# Patient Record
Sex: MALE | Race: WHITE | NOT HISPANIC OR LATINO | Employment: UNEMPLOYED | URBAN - NONMETROPOLITAN AREA
[De-identification: names, ages, dates, MRNs, and addresses within clinical notes are randomized per-mention and may not be internally consistent; named-entity substitution may affect disease eponyms.]

---

## 2017-03-13 ENCOUNTER — HOSPITAL ENCOUNTER (EMERGENCY)
Facility: HOSPITAL | Age: 22
Discharge: PSYCHIATRIC OR SUBSTANCE USE FACILITY | End: 2017-03-13
Attending: EMERGENCY MEDICINE | Admitting: EMERGENCY MEDICINE

## 2017-03-13 ENCOUNTER — HOSPITAL ENCOUNTER (INPATIENT)
Facility: HOSPITAL | Age: 22
LOS: 8 days | Discharge: HOME | End: 2017-03-21
Attending: PSYCHIATRY & NEUROLOGY | Admitting: PSYCHIATRY & NEUROLOGY

## 2017-03-13 VITALS
SYSTOLIC BLOOD PRESSURE: 165 MMHG | HEART RATE: 122 BPM | RESPIRATION RATE: 19 BRPM | HEIGHT: 71 IN | WEIGHT: 280 LBS | DIASTOLIC BLOOD PRESSURE: 103 MMHG | OXYGEN SATURATION: 98 % | BODY MASS INDEX: 39.2 KG/M2 | TEMPERATURE: 98.2 F

## 2017-03-13 DIAGNOSIS — Z91.89 POTENTIAL FOR HARM TO OTHERS: Primary | ICD-10-CM

## 2017-03-13 PROBLEM — R45.851 SUICIDAL IDEATIONS: Status: ACTIVE | Noted: 2017-03-13

## 2017-03-13 LAB
ALBUMIN SERPL-MCNC: 4.8 G/DL (ref 3.4–4.8)
ALBUMIN/GLOB SERPL: 1.3 G/DL (ref 1.1–1.8)
ALP SERPL-CCNC: 142 U/L (ref 38–126)
ALT SERPL W P-5'-P-CCNC: 48 U/L (ref 21–72)
AMPHET+METHAMPHET UR QL: NEGATIVE
ANION GAP SERPL CALCULATED.3IONS-SCNC: 16 MMOL/L (ref 5–15)
APAP SERPL-MCNC: <10 MCG/ML (ref 10–30)
AST SERPL-CCNC: 30 U/L (ref 17–59)
BARBITURATES UR QL SCN: NEGATIVE
BASOPHILS # BLD AUTO: 0.05 10*3/MM3 (ref 0–0.2)
BASOPHILS NFR BLD AUTO: 0.4 % (ref 0–2)
BENZODIAZ UR QL SCN: NEGATIVE
BILIRUB SERPL-MCNC: 0.5 MG/DL (ref 0.2–1.3)
BILIRUB UR QL STRIP: NEGATIVE
BUN BLD-MCNC: 15 MG/DL (ref 7–21)
BUN/CREAT SERPL: 19.5 (ref 7–25)
CALCIUM SPEC-SCNC: 9.4 MG/DL (ref 8.4–10.2)
CANNABINOIDS SERPL QL: NEGATIVE
CHLORIDE SERPL-SCNC: 100 MMOL/L (ref 95–110)
CLARITY UR: ABNORMAL
CO2 SERPL-SCNC: 25 MMOL/L (ref 22–31)
COCAINE UR QL: NEGATIVE
COLOR UR: YELLOW
CREAT BLD-MCNC: 0.77 MG/DL (ref 0.7–1.3)
DEPRECATED RDW RBC AUTO: 40.7 FL (ref 35.1–43.9)
EOSINOPHIL # BLD AUTO: 0.09 10*3/MM3 (ref 0–0.7)
EOSINOPHIL NFR BLD AUTO: 0.7 % (ref 0–7)
ERYTHROCYTE [DISTWIDTH] IN BLOOD BY AUTOMATED COUNT: 13.4 % (ref 11.5–14.5)
ETHANOL BLD-MCNC: <10 MG/DL (ref 0–10)
ETHANOL UR QL: <0.01 %
GFR SERPL CREATININE-BSD FRML MDRD: 125 ML/MIN/1.73 (ref 77–179)
GLOBULIN UR ELPH-MCNC: 3.6 GM/DL (ref 2.3–3.5)
GLUCOSE BLD-MCNC: 100 MG/DL (ref 60–100)
GLUCOSE UR STRIP-MCNC: NEGATIVE MG/DL
HCT VFR BLD AUTO: 49.2 % (ref 39–49)
HGB BLD-MCNC: 17.3 G/DL (ref 13.7–17.3)
HGB UR QL STRIP.AUTO: NEGATIVE
IMM GRANULOCYTES # BLD: 0.11 10*3/MM3 (ref 0–0.02)
IMM GRANULOCYTES NFR BLD: 0.8 % (ref 0–0.5)
KETONES UR QL STRIP: NEGATIVE
LEUKOCYTE ESTERASE UR QL STRIP.AUTO: NEGATIVE
LYMPHOCYTES # BLD AUTO: 1.99 10*3/MM3 (ref 0.6–4.2)
LYMPHOCYTES NFR BLD AUTO: 15.3 % (ref 10–50)
MCH RBC QN AUTO: 29.3 PG (ref 26.5–34)
MCHC RBC AUTO-ENTMCNC: 35.2 G/DL (ref 31.5–36.3)
MCV RBC AUTO: 83.4 FL (ref 80–98)
METHADONE UR QL SCN: NEGATIVE
MONOCYTES # BLD AUTO: 0.78 10*3/MM3 (ref 0–0.9)
MONOCYTES NFR BLD AUTO: 6 % (ref 0–12)
NEUTROPHILS # BLD AUTO: 9.97 10*3/MM3 (ref 2–8.6)
NEUTROPHILS NFR BLD AUTO: 76.8 % (ref 37–80)
NITRITE UR QL STRIP: NEGATIVE
OPIATES UR QL: NEGATIVE
OXYCODONE UR QL SCN: NEGATIVE
PH UR STRIP.AUTO: 7 [PH] (ref 5–9)
PLATELET # BLD AUTO: 227 10*3/MM3 (ref 150–450)
PMV BLD AUTO: 9.1 FL (ref 8–12)
POTASSIUM BLD-SCNC: 4.4 MMOL/L (ref 3.5–5.1)
PROT SERPL-MCNC: 8.4 G/DL (ref 6.3–8.6)
PROT UR QL STRIP: NEGATIVE
RBC # BLD AUTO: 5.9 10*6/MM3 (ref 4.37–5.74)
SALICYLATES SERPL-MCNC: <1 MG/DL (ref 10–20)
SODIUM BLD-SCNC: 141 MMOL/L (ref 137–145)
SP GR UR STRIP: 1.02 (ref 1–1.03)
T4 FREE SERPL-MCNC: 1.22 NG/DL (ref 0.78–2.19)
TSH SERPL DL<=0.05 MIU/L-ACNC: 1.09 MIU/ML (ref 0.46–4.68)
UROBILINOGEN UR QL STRIP: ABNORMAL
WBC NRBC COR # BLD: 12.99 10*3/MM3 (ref 3.2–9.8)

## 2017-03-13 RX ORDER — HYDROXYZINE PAMOATE 50 MG/1
50 CAPSULE ORAL EVERY 6 HOURS PRN
Status: DISCONTINUED | OUTPATIENT
Start: 2017-03-13 | End: 2017-03-21 | Stop reason: HOSPADM

## 2017-03-13 RX ORDER — OLANZAPINE 5 MG/1
10 TABLET, ORALLY DISINTEGRATING ORAL ONCE
Status: CANCELLED | OUTPATIENT
Start: 2017-03-13

## 2017-03-13 RX ORDER — NICOTINE 21 MG/24HR
1 PATCH, TRANSDERMAL 24 HOURS TRANSDERMAL EVERY 24 HOURS
Status: DISCONTINUED | OUTPATIENT
Start: 2017-03-13 | End: 2017-03-21 | Stop reason: HOSPADM

## 2017-03-13 RX ORDER — SERTRALINE HYDROCHLORIDE 100 MG/1
100 TABLET, FILM COATED ORAL DAILY
COMMUNITY
End: 2017-03-21 | Stop reason: HOSPADM

## 2017-03-13 RX ORDER — TRAZODONE HYDROCHLORIDE 50 MG/1
50 TABLET ORAL NIGHTLY PRN
Status: DISCONTINUED | OUTPATIENT
Start: 2017-03-13 | End: 2017-03-21 | Stop reason: HOSPADM

## 2017-03-13 RX ORDER — ACETAMINOPHEN 325 MG/1
650 TABLET ORAL EVERY 4 HOURS PRN
Status: DISCONTINUED | OUTPATIENT
Start: 2017-03-13 | End: 2017-03-21 | Stop reason: HOSPADM

## 2017-03-13 RX ORDER — BUSPIRONE HYDROCHLORIDE 10 MG/1
10 TABLET ORAL 3 TIMES DAILY
COMMUNITY
End: 2017-03-21 | Stop reason: HOSPADM

## 2017-03-13 RX ORDER — OLANZAPINE 5 MG/1
10 TABLET, ORALLY DISINTEGRATING ORAL ONCE
Status: COMPLETED | OUTPATIENT
Start: 2017-03-13 | End: 2017-03-13

## 2017-03-13 RX ORDER — HYDROXYZINE PAMOATE 25 MG/1
50 CAPSULE ORAL EVERY 6 HOURS PRN
Status: CANCELLED | OUTPATIENT
Start: 2017-03-13

## 2017-03-13 RX ORDER — ACETAMINOPHEN 325 MG/1
650 TABLET ORAL EVERY 4 HOURS PRN
Status: CANCELLED | OUTPATIENT
Start: 2017-03-13

## 2017-03-13 RX ORDER — TRAZODONE HYDROCHLORIDE 50 MG/1
50 TABLET ORAL NIGHTLY PRN
Status: CANCELLED | OUTPATIENT
Start: 2017-03-13

## 2017-03-13 RX ORDER — MAGNESIUM HYDROXIDE/ALUMINUM HYDROXICE/SIMETHICONE 120; 1200; 1200 MG/30ML; MG/30ML; MG/30ML
30 SUSPENSION ORAL EVERY 6 HOURS PRN
Status: CANCELLED | OUTPATIENT
Start: 2017-03-13

## 2017-03-13 RX ORDER — NICOTINE 21 MG/24HR
1 PATCH, TRANSDERMAL 24 HOURS TRANSDERMAL EVERY 24 HOURS
Status: CANCELLED | OUTPATIENT
Start: 2017-03-13

## 2017-03-13 RX ORDER — LOPERAMIDE HYDROCHLORIDE 2 MG/1
2 CAPSULE ORAL 4 TIMES DAILY PRN
Status: CANCELLED | OUTPATIENT
Start: 2017-03-13

## 2017-03-13 RX ORDER — MAGNESIUM HYDROXIDE/ALUMINUM HYDROXICE/SIMETHICONE 120; 1200; 1200 MG/30ML; MG/30ML; MG/30ML
30 SUSPENSION ORAL EVERY 6 HOURS PRN
Status: DISCONTINUED | OUTPATIENT
Start: 2017-03-13 | End: 2017-03-21 | Stop reason: HOSPADM

## 2017-03-13 RX ORDER — LOPERAMIDE HYDROCHLORIDE 2 MG/1
2 CAPSULE ORAL 4 TIMES DAILY PRN
Status: DISCONTINUED | OUTPATIENT
Start: 2017-03-13 | End: 2017-03-21 | Stop reason: HOSPADM

## 2017-03-13 RX ADMIN — OLANZAPINE 10 MG: 5 TABLET, ORALLY DISINTEGRATING ORAL at 21:16

## 2017-03-13 NOTE — ED PROVIDER NOTES
Subjective     History provided by:  Patient and parent    Rory Alvarado is a 22 year old male with a multiyear history of mood disorder, seen by Alexia Monk at [facility] who presents with worsening anxiety and behavior over the last 3-4 months. States that he has been anxious all of his life but that it has gotten worse lately. Parents state that he has been increasingly up all night and sleeping during the day. Has lost interest in his normal hobbies and just stays inside. Has had trouble concentrating. Has had increase in appetite with significant weight gain over the last year. Has psychomotor agitation regularly paces around the house. Denies thoughts of hurting self. Has had thoughts of hurting his cousin. Was in court this morning after being arrested while sitting in front of his pantry with a firearm and making threats towards the cousin. Denies auditory or visual hallucinations. Currently takes Zoloft and Buspar for his anxiety which he says does not help. Has previously taken Seroquel and some other medications patient did not remember. Had been on Adderall which he stopped about a year ago for ADD.    Review of Systems   Constitutional: Positive for activity change and appetite change (increased). Negative for chills, diaphoresis, fatigue and fever.   HENT: Negative for congestion, rhinorrhea and sore throat.    Eyes: Negative for pain, discharge, itching and visual disturbance.   Respiratory: Negative for cough, chest tightness, shortness of breath and wheezing.    Cardiovascular: Negative for chest pain and palpitations.   Gastrointestinal: Positive for nausea (sometimes with the anxiety). Negative for abdominal distention, abdominal pain, blood in stool, diarrhea and vomiting.   Genitourinary: Negative for dysuria, flank pain and hematuria.   Musculoskeletal: Negative for arthralgias, neck pain and neck stiffness.   Skin: Negative for color change and rash.   Neurological: Negative  for dizziness, seizures, syncope and headaches.   Psychiatric/Behavioral: Positive for agitation, behavioral problems, decreased concentration and sleep disturbance. Negative for confusion, hallucinations, self-injury and suicidal ideas. The patient is nervous/anxious.        History reviewed. No pertinent past medical history.    No Known Allergies    History reviewed. No pertinent past surgical history.    No family history on file.    Social History     Social History   • Marital status: Single     Spouse name: N/A   • Number of children: N/A   • Years of education: N/A     Social History Main Topics   • Smoking status: Current Some Day Smoker     Packs/day: 1.00     Years: 5.00     Types: Cigarettes     Start date: 3/13/2012   • Smokeless tobacco: Current User     Types: Chew   • Alcohol use No   • Drug use: Yes     Special: Marijuana   • Sexual activity: No     Other Topics Concern   • None     Social History Narrative   • None           Objective   Physical Exam   Constitutional: He is oriented to person, place, and time. He appears well-developed and well-nourished. No distress.   HENT:   Head: Normocephalic and atraumatic.   Right Ear: External ear normal.   Left Ear: External ear normal.   Nose: Nose normal.   Mouth/Throat: Oropharynx is clear and moist. No oropharyngeal exudate.   Eyes: Conjunctivae and EOM are normal. Pupils are equal, round, and reactive to light. Right eye exhibits no discharge. Left eye exhibits no discharge. No scleral icterus.   Neck: Normal range of motion. Neck supple. No JVD present. No tracheal deviation present. No thyromegaly present.   Cardiovascular: Normal rate, regular rhythm, normal heart sounds and intact distal pulses.  Exam reveals no gallop and no friction rub.    No murmur heard.  Pulmonary/Chest: Effort normal and breath sounds normal. No stridor. No respiratory distress. He has no wheezes. He has no rales.   Abdominal: Soft. Bowel sounds are normal. He exhibits no  distension and no mass. There is no tenderness. There is no rebound and no guarding. No hernia.   Musculoskeletal: Normal range of motion. He exhibits no edema, tenderness or deformity.   Neurological: He is alert and oriented to person, place, and time. No cranial nerve deficit. He exhibits normal muscle tone.   Skin: Skin is warm and dry. No rash noted. He is not diaphoretic. No pallor.   Psychiatric: His speech is normal. Judgment normal. His mood appears anxious. He is agitated. He is not aggressive, not hyperactive, not slowed, not withdrawn, not actively hallucinating and not combative. Thought content is not delusional. He expresses homicidal (thoughts of harming his cousin) ideation. He expresses no suicidal ideation. He expresses no suicidal plans and no homicidal plans. He is attentive.   Nursing note and vitals reviewed.      Procedures      Lab Results (last 24 hours)     Procedure Component Value Units Date/Time    TSH [[id number]]  (Normal) Collected:  03/13/17 1658    Specimen:  Blood Updated:  03/13/17 1758     TSH 1.090 mIU/mL     T4, Free [[id number]]  (Normal) Collected:  03/13/17 1658    Specimen:  Blood Updated:  03/13/17 1745     Free T4 1.22 ng/dL     CBC & Differential [[id number]] Collected:  03/13/17 1659    Specimen:  Blood Updated:  03/13/17 1715    Narrative:       The following orders were created for panel order CBC & Differential.  Procedure                               Abnormality         Status                     ---------                               -----------         ------                     CBC Auto Differential[[id number]]         Abnormal            Final result                 Please view results for these tests on the individual orders.    Comprehensive Metabolic Panel [[id number]]  (Abnormal) Collected:  03/13/17 1659    Specimen:  Blood Updated:  03/13/17 1730     Glucose 100 mg/dL      BUN 15 mg/dL      Creatinine 0.77 mg/dL      Sodium 141 mmol/L      Potassium 4.4 mmol/L       Chloride 100 mmol/L      CO2 25.0 mmol/L      Calcium 9.4 mg/dL      Total Protein 8.4 g/dL      Albumin 4.80 g/dL      ALT (SGPT) 48 U/L      AST (SGOT) 30 U/L      Alkaline Phosphatase 142 (H) U/L      Total Bilirubin 0.5 mg/dL      eGFR Non African Amer 125 mL/min/1.73      Globulin 3.6 (H) gm/dL      A/G Ratio 1.3 g/dL      BUN/Creatinine Ratio 19.5      Anion Gap 16.0 (H) mmol/L     Urinalysis With / Culture If Indicated [[id number]]  (Abnormal) Collected:  03/13/17 1659    Specimen:  Urine from Urine, Clean Catch Updated:  03/13/17 1721     Color, UA Yellow      Appearance, UA Cloudy (A)      pH, UA 7.0      Specific Gravity, UA 1.022      Glucose, UA Negative      Ketones, UA Negative      Bilirubin, UA Negative      Blood, UA Negative      Protein, UA Negative      Leuk Esterase, UA Negative      Nitrite, UA Negative      Urobilinogen, UA 0.2 E.U./dL     Narrative:       Urine microscopic not indicated.    Salicylate Level [[id number]]  (Abnormal) Collected:  03/13/17 1659    Specimen:  Blood Updated:  03/13/17 1730     Salicylate <1.0 (L) mg/dL     Acetaminophen Level [[id number]]  (Abnormal) Collected:  03/13/17 1659    Specimen:  Blood Updated:  03/13/17 1731     Acetaminophen <10.0 (L) mcg/mL     Ethanol [[id number]] Collected:  03/13/17 1659    Specimen:  Blood Updated:  03/13/17 1729     Ethanol <10 mg/dL      Ethanol % <0.010 %     Urine Drug Screen [[id number]]  (Normal) Collected:  03/13/17 1659    Specimen:  Urine from Urine, Clean Catch Updated:  03/13/17 1742     Amphetamine Screen, Urine Negative      Barbiturates Screen, Urine Negative      Benzodiazepine Screen, Urine Negative      Cocaine Screen, Urine Negative      Methadone Screen, Urine Negative      Opiate Screen Negative      Oxycodone Screen, Urine Negative      THC, Screen, Urine Negative     Narrative:       Negative Thresholds For Drugs Screened in Urine:     Amphetamines          500 ng/ml  Barbiturates          200 ng/ml  Benzodiazepines        200 ng/ml  Cocaine               150 ng/ml  Methadone             300 ng/mL  Opiates               300 ng/mL  Oxycodone             100 ng/mL  THC                   20 ng/mL    The normal value for all drugs tested is negative. This report includes final unconfirmed screening results.  A positive result by this assay can be, at your request, sent to the Reference Lab for confirmation by gas chromatography. Unconfirmed results must not be used for non-medical purposes, such as employment or legal testing. Clinical consideration should be applied to any drug of abuse test result, particularly when unconfirmed results are used.    CBC Auto Differential [[id number]]  (Abnormal) Collected:  03/13/17 1659    Specimen:  Blood Updated:  03/13/17 1715     WBC 12.99 (H) 10*3/mm3      RBC 5.90 (H) 10*6/mm3      Hemoglobin 17.3 g/dL      Hematocrit 49.2 (H) %      MCV 83.4 fL      MCH 29.3 pg      MCHC 35.2 g/dL      RDW 13.4 %      RDW-SD 40.7 fl      MPV 9.1 fL      Platelets 227 10*3/mm3      Neutrophil % 76.8 %      Lymphocyte % 15.3 %      Monocyte % 6.0 %      Eosinophil % 0.7 %      Basophil % 0.4 %      Immature Grans % 0.8 (H) %      Neutrophils, Absolute 9.97 (H) 10*3/mm3      Lymphocytes, Absolute 1.99 10*3/mm3      Monocytes, Absolute 0.78 10*3/mm3      Eosinophils, Absolute 0.09 10*3/mm3      Basophils, Absolute 0.05 10*3/mm3      Immature Grans, Absolute 0.11 (H) 10*3/mm3           No results found.         ED Course  ED Course   Comment By Time   Seen and examined Leonid Cardenas MD 03/13 1612   Psych contacted to come evaluate. Leonid Cardenas MD 03/13 1628   Pt resting comfortably in room Leonid Cardenas MD 03/13 1912                  [facility]    Final diagnoses:   Potential for harm to others            Leonid Cardenas MD  Resident  03/13/17 1922

## 2017-03-14 PROBLEM — F31.5 BIPOLAR AFFECTIVE DISORDER, DEPRESSED, SEVERE, WITH PSYCHOTIC BEHAVIOR (HCC): Status: ACTIVE | Noted: 2017-03-14

## 2017-03-14 PROBLEM — F10.10 ALCOHOL USE DISORDER, MILD, ABUSE: Status: ACTIVE | Noted: 2017-03-14

## 2017-03-14 PROCEDURE — 99222 1ST HOSP IP/OBS MODERATE 55: CPT | Performed by: FAMILY MEDICINE

## 2017-03-14 PROCEDURE — 90791 PSYCH DIAGNOSTIC EVALUATION: CPT | Performed by: PSYCHIATRY & NEUROLOGY

## 2017-03-14 RX ADMIN — HYDROXYZINE PAMOATE 50 MG: 50 CAPSULE ORAL at 08:11

## 2017-03-14 RX ADMIN — ZIPRASIDONE HCL 60 MG: 20 CAPSULE ORAL at 18:05

## 2017-03-14 NOTE — PLAN OF CARE
Problem: Risk for Self Injury/Neglect  Goal: Treatment Goal: Remain safe during length of stay, learn and adopt new coping skills, and be free of self-injurious ideation, impulses and acts at the time of discharge  Outcome: Ongoing (interventions implemented as appropriate)  Goal: Verbalize thoughts and feelings associated with:  Outcome: Ongoing (interventions implemented as appropriate)  Goal: Refrain from harming self  Outcome: Ongoing (interventions implemented as appropriate)  Goal: Attend and participate in unit activities, including therapeutic, recreational, and educational groups  Outcome: Ongoing (interventions implemented as appropriate)  Goal: Recognize maladaptive responses and adopt new coping mechanisms  Outcome: Ongoing (interventions implemented as appropriate)  Goal: Complete daily ADLs, including personal hygiene independently, as able  Outcome: Ongoing (interventions implemented as appropriate)    Problem: BH Overarching Goals  Goal: Adheres to Safety Considerations for Self and Others  Outcome: Ongoing (interventions implemented as appropriate)  Goal: Optimized Coping Skills in Response to Life Stressors  Outcome: Ongoing (interventions implemented as appropriate)  Goal: Develops/Participates in Therapeutic [place] to Support Successful Transition  Outcome: Ongoing (interventions implemented as appropriate)

## 2017-03-14 NOTE — NURSING NOTE
Dr Gabriel MATA         General  NONE    Eyes   None     ENT/Mouth   None    Cardio   None    Resp   None    GI    None       None    MS    None    Skin/Hair/Nails   None    Neuro   None

## 2017-03-14 NOTE — NURSING NOTE
"Behavior   Anxiety 8  Depression 10  Pain 0  AVH no  S/I no  H/I no    Pt resting in his bed this morning.  He refused to get up to eat breakfast.  Pt has very flat affect and makes very little eye contact when speaking with this nurse.  Pt stated that he does not want to talk about the reason why he is here.  \"I just got angry with my cousin is all I'm going to say.\"  Pt did say that he slept good last night.         Intervention  Instructed in med usage and effects, encouraged to verbalize needs. Meds administered as ordered.  Pt encouraged to attend groups and follow treatment plan.          Response  Verbalized understanding           Plan  Continue to monitor for safety/  every 15 minute monitoring checks.  "
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
## 2017-03-14 NOTE — NURSING NOTE
"Behavior   Anxiety 10  Depression 10  Pain 0  AVH no  S/I no  H/I no  Denies SI at present time.  He signed adm. papers,  Given tour of unit.  Given scrubs to sleep in.  He was polite, cooperative but refused to look at me.  He gave brief answers, would not elaborate on any question.  He seemed guarded in what he said to me.  He took zydis at hs but was reluctant.  He kept asking \"now this won't freak me out will it.\"  Explained sev. times that it would help him relax and sleep.          Intervention  Instructed in med usage and effects, encouraged to verbalize needs. Meds administered as ordered.          Response  Verbalized understanding           Plan  Continue to monitor for safety/  every 15 minute monitoring checks.    "

## 2017-03-14 NOTE — ED NOTES
"Deputy Fabian Juárez called and was informed of pt's homicidal ideation of \"Russel Fishman\".  Deputy Juárez was given further information about event and informed this RN that he would be contacting [facility] department of same.     Nidia Juárez RN  03/13/17 1919    "

## 2017-03-14 NOTE — CONSULTS
CHIEF COMPLAINT/REASON FOR VISIT:  Homicidal Ideation and Agitation    HPI:  Patient presented to our ED with the above complaint on March 13 at 1:51 AM. ED notes: Was in court this morning after being arrested while sitting in front of his pantry with a firearm and making threats towards the cousin. Denies auditory or visual hallucinations.  Patient prefers not to talk to me this morning.     PROBLEM LIST:  Patient Active Problem List    Diagnosis   • Bipolar affective disorder, depressed, severe, with psychotic behavior [F31.5]   • Alcohol use disorder, mild, abuse [F10.10]   • Suicidal ideations [R45.851]         CURRENT MEDICATIONS:  Prescriptions Prior to Admission   Medication Sig Dispense Refill Last Dose   • busPIRone (BUSPAR) 10 MG tablet Take 10 mg by mouth 3 (Three) Times a Day.   3/12/2017 at Unknown time   • sertraline (ZOLOFT) 100 MG tablet Take 100 mg by mouth Daily.   3/12/2017 at Unknown time       ALLERGIES:  Review of patient's allergies indicates no known allergies.      PAST MEDICAL/SURGICAL HISTORY:  Past Medical History   Diagnosis Date   • Alcohol use disorder, mild, abuse 3/14/2017   • Anxiety    • Bipolar affective disorder, depressed, severe, with psychotic behavior 3/14/2017   • Depression        History reviewed. No pertinent past surgical history.    Review of Systems   Constitutional: Negative for activity change, appetite change, fatigue and fever.   HENT: Negative for congestion, ear discharge, ear pain, facial swelling, hearing loss, nosebleeds, postnasal drip, rhinorrhea, sinus pressure, sore throat, tinnitus and trouble swallowing.    Eyes: Negative for pain, discharge and visual disturbance.   Respiratory: Negative for cough, shortness of breath and wheezing.    Cardiovascular: Negative for chest pain, palpitations and leg swelling.   Gastrointestinal: Negative for abdominal pain, blood in stool, constipation, diarrhea, nausea and vomiting.   Genitourinary: Negative for  difficulty urinating, discharge, dysuria, flank pain, frequency, hematuria, penile pain, penile swelling, scrotal swelling, testicular pain and urgency.   Musculoskeletal: Negative for arthralgias, back pain, joint swelling, myalgias and neck pain.   Skin: Negative for rash and wound.   Neurological: Negative for dizziness, seizures, syncope, weakness, light-headedness and headaches.   Hematological: Negative for adenopathy.       Social History     Social History   • Marital status: Single     Spouse name: N/A   • Number of children: N/A   • Years of education: N/A     Occupational History   • Not on file.     Social History Main Topics   • Smoking status: Current Some Day Smoker     Packs/day: 0.00     Years: 5.00     Types: Cigarettes     Start date: 3/13/2012   • Smokeless tobacco: Current User     Types: Chew      Comment: smokes occ, not every day   • Alcohol use No   • Drug use: Yes     Special: Marijuana   • Sexual activity: No     Other Topics Concern   • Not on file     Social History Narrative    Substance Abuse: Alcohol: has had periods of a few weeks of daily and sometimes mild binge, nothing in last 1-2 months,  Cannabis: back in high school, nothing since then, Methamphetamine: does not use, Opiate: does not use, Cocaine: does not use, Synthetic: does not use and IV drug use: denies; notes in high school he tried things at parties but not sure what        Marriages: 0    Current Relationships: Single    Children: 0        Education: high school diploma/GED     Occupation: individual, not currently working    Living Situation: mother and father       Family History   Problem Relation Age of Onset   • Anxiety disorder Mother    • Depression Mother    • Diabetes Father    • Fibromyalgia Father    • Anxiety disorder Father    • Depression Father    • Depression Sister    • Psychosis Paternal Grandmother    • Psychosis Other    • Suicidality Neg Hx              Objective     Visit Vitals   • /84 (BP  "Location: Right arm, Patient Position: Sitting)   • Pulse 96   • Temp 97.8 °F (36.6 °C) (Tympanic)   • Resp 18   • Ht 71\" (180.3 cm)   • Wt 283 lb (128 kg)   • SpO2 96%   • BMI 39.47 kg/m2       Physical Exam   Constitutional: He appears well-developed and well-nourished.   HENT:   Head: Normocephalic and atraumatic.   Eyes: Conjunctivae and EOM are normal.   Neck: Normal range of motion. Neck supple. No thyromegaly present.   Cardiovascular: Normal rate, regular rhythm and normal heart sounds.  Exam reveals no gallop and no friction rub.    No murmur heard.  Pulmonary/Chest: Effort normal and breath sounds normal. No respiratory distress. He has no wheezes. He has no rales.   Abdominal: Soft. He exhibits no distension and no mass. There is no tenderness. There is no rebound and no guarding.   Musculoskeletal: Normal range of motion.   Lymphadenopathy:     He has no cervical adenopathy.   Neurological: He is alert. He has normal strength and normal reflexes. He displays no tremor and normal reflexes. No cranial nerve deficit or sensory deficit. He exhibits normal muscle tone. Coordination normal. He displays no Babinski's sign on the right side. He displays no Babinski's sign on the left side.   Reflex Scores:       Tricep reflexes are 2+ on the right side and 2+ on the left side.       Bicep reflexes are 2+ on the right side and 2+ on the left side.       Brachioradialis reflexes are 2+ on the right side and 2+ on the left side.       Patellar reflexes are 2+ on the right side and 2+ on the left side.       Achilles reflexes are 2+ on the right side and 2+ on the left side.  Skin: Skin is warm and dry. No rash noted. No erythema.   Nursing note and vitals reviewed.      Dystonia/Tardive Dyskinesia  Absent  Meningeal Signs  Absent    Diagnostic Studies    TSH [[id number]] (Normal) Collected: 03/13/17 1658      Specimen: Blood Updated: 03/13/17 1758       TSH 1.090 mIU/mL       T4, Free [[id number]] (Normal) Collected: " 03/13/17 1658     Specimen: Blood Updated: 03/13/17 1745       Free T4 1.22 ng/dL       CBC & Differential [[id number]] Collected: 03/13/17 1659     Specimen: Blood Updated: 03/13/17 1715     Narrative:       The following orders were created for panel order CBC & Differential.  Procedure Abnormality Status   --------- ----------- ------   CBC Auto Differential[[id number]] Abnormal Final result      Please view results for these tests on the individual orders.     Comprehensive Metabolic Panel [[id number]] (Abnormal) Collected: 03/13/17 1659     Specimen: Blood Updated: 03/13/17 1730       Glucose 100 mg/dL         BUN 15 mg/dL         Creatinine 0.77 mg/dL         Sodium 141 mmol/L         Potassium 4.4 mmol/L         Chloride 100 mmol/L         CO2 25.0 mmol/L         Calcium 9.4 mg/dL         Total Protein 8.4 g/dL         Albumin 4.80 g/dL         ALT (SGPT) 48 U/L         AST (SGOT) 30 U/L         Alkaline Phosphatase 142 (H) U/L         Total Bilirubin 0.5 mg/dL         eGFR Non African Amer 125 mL/min/1.73         Globulin 3.6 (H) gm/dL         A/G Ratio 1.3 g/dL         BUN/Creatinine Ratio 19.5         Anion Gap 16.0 (H) mmol/L       Urinalysis With / Culture If Indicated [[id number]] (Abnormal) Collected: 03/13/17 1659     Specimen: Urine from Urine, Clean Catch Updated: 03/13/17 1721       Color, UA Yellow         Appearance, UA Cloudy (A)         pH, UA 7.0         Specific Gravity, UA 1.022         Glucose, UA Negative         Ketones, UA Negative         Bilirubin, UA Negative         Blood, UA Negative         Protein, UA Negative         Leuk Esterase, UA Negative         Nitrite, UA Negative         Urobilinogen, UA 0.2 E.U./dL       Narrative:       Urine microscopic not indicated.     Salicylate Level [[id number]] (Abnormal) Collected: 03/13/17 1659     Specimen: Blood Updated: 03/13/17 1730       Salicylate <1.0 (L) mg/dL       Acetaminophen Level [[id number]] (Abnormal) Collected: 03/13/17 1659      Specimen: Blood Updated: 03/13/17 1731       Acetaminophen <10.0 (L) mcg/mL       Ethanol [[id number]] Collected: 03/13/17 1659     Specimen: Blood Updated: 03/13/17 1729       Ethanol <10 mg/dL         Ethanol % <0.010 %       Urine Drug Screen [[id number]] (Normal) Collected: 03/13/17 1659     Specimen: Urine from Urine, Clean Catch Updated: 03/13/17 1742       Amphetamine Screen, Urine Negative         Barbiturates Screen, Urine Negative         Benzodiazepine Screen, Urine Negative         Cocaine Screen, Urine Negative         Methadone Screen, Urine Negative         Opiate Screen Negative         Oxycodone Screen, Urine Negative         THC, Screen, Urine Negative      WBC 12.99 (H) 10*3/mm3           RBC 5.90 (H) 10*6/mm3         Hemoglobin 17.3 g/dL         Hematocrit 49.2 (H) %            Assessment/Plan     Patient Active Problem List    Diagnosis   • Bipolar affective disorder, depressed, severe, with psychotic behavior [F31.5]   • Alcohol use disorder, mild, abuse [F10.10]   • Suicidal ideations [R45.851]           Continue Home Meds as ordered. Mental health and pain issues managed per psychiatry.  Further diagnostic studies or intervention based on hospital course.

## 2017-03-14 NOTE — ED NOTES
"Called [facility] to inform of pt with homicidal ideation.  [facility] informed this RN to call the \"County in which the event occurred\".  [facility] was informed of \"no event\" but that pt was in ED and expressed homicidal ideation with a specific person.  Informed to call [facility].     Nidia Juárez RN  03/13/17 1915    "

## 2017-03-14 NOTE — ED NOTES
"Called [facility] to inform of homicidal ideation from pt.  Informing of pt living in [facility] verbalizing homicidal ideation of pt's cousin: \"Russel Fishman\".  Telephone number was taken and was informed that I would receive a telephone call related to homicidal verbalization.     Nidia Juárez RN  03/13/17 1917    "

## 2017-03-14 NOTE — H&P
"3/14/2017    Source of History:  chart review and the patient    Chief Complaint: suicidal ideation, homicidal ideation, paranoia, anxiety and depression    History of Present Illness:    Patient is a 22 y.o. male who presents with suicidal ideation, homicidal ideation, paranoia, anxiety and depression. Onset of symptoms was gradual starting 1-2 years ago.  Symptoms have been present on a increasingly more frequent basis. Symptoms are associated with anxiety, depressed mood, irritability and ETOH use issues.  Symptoms are aggravated by economic problems and self esteem and cognitive.  Patients symptoms severity is severe.   Patient reports that level of hopefulness is 1/10.  Patient's symptoms occur in the context of chronic mild depression that has gotten much worse due to no clear situational stressors.  In the emergency ER he reported HI towards cousin.  He notes dealing with anxiety and depression and some anger issues.  He notes parents told him to come to ER.  He went [facility] b/c he had gun and he did not have conceal carry.  He had a gun in the back pocket.  \"I was scaring people.\"  He notes he was staring at people and was sitting in truck smoking cigs for a few hours at a convenience shop.  The first time the police let him go.  The second time he was not there very long and the  came.  This happened around Friday and Saturday.  He notes he will stay at home and the anxiety \"will be crippling.\"  He does not feel safe in public.  He notes he has felt this way for the last year.  He feels people are staring at him and making fun of him.  He has not confronted anyone about it.  He notes sleeps all day and is up at night.  \"Feel like I'm in a big hole.\"  He notes \"when I am about to find my way out I get lost again.\"  He notes howell were problematic.  He notes being depressed for a month or more then \"I'd be all right.\"  He notes he does note interact much with any of his friends.  He notes some depression " has been there for most of his life but for the last 18 months he is very depressed and isolating.  He notes avoiding people he knows in public b/c he has no desire to interact.  He notes anhedonia is significant.  He has cut back on his hunting.  He notes last time was Feb but last time fishing has been about 1 year.  He notes he has had issues with his cousin for some time but denies that he wants to go find him and do something to him.  The ER notes indicate communication with Aunt asking his location and making statement that he wanted to use gun, knife or hand.  He denies any AVH.  He notes his mom is his reason for not attempted suicide.  He notes 4 years MGF  and that was very difficult for him b/c grief and family issues that started afterward.  He notes since the zyprexa last night he feels less paranoid and some calm.  He notes having periods of 2-3 days of decreased need for sleep and he will spend time in the garage tinkering with things.  He notes nothing gets accomplished but he is active.  Denies grandiosity and denies recklessness.      Psychiatric Review Of Systems:  Pertinent items are noted in HPI.    History  Past psychiatric history:    Psychiatric Hospitalizations: Patient has had no prior hospitalizations.    Suicide Attempts: Patient has had no prior suicide attempts.    Prior Treatment and Medications Tried: zoloft: took for 2-3 months at 100mg and did not find it helpful; buspar 10mg tid and did not find it helped anything; seroquel: sedated and felt he had hangover; possibly abilify was tried.  Prozac and paxil did nothing.    History of violence or legal issues: The patient has no significant history of legal issues other than this weekend with a criminal trespass charge.    Social History:    Social History     Social History   • Marital status: Single     Spouse name: N/A   • Number of children: N/A   • Years of education: N/A     Occupational History   • Not on file.     Social  History Main Topics   • Smoking status: Current Some Day Smoker     Packs/day: 0.00     Years: 5.00     Types: Cigarettes     Start date: 3/13/2012   • Smokeless tobacco: Current User     Types: Chew      Comment: smokes occ, not every day   • Alcohol use No   • Drug use: Yes     Special: Marijuana   • Sexual activity: No     Other Topics Concern   • Not on file     Social History Narrative    Substance Abuse: Alcohol: has had periods of a few weeks of daily and sometimes mild binge, nothing in last 1-2 months,  Cannabis: back in high school, nothing since then, Methamphetamine: does not use, Opiate: does not use, Cocaine: does not use, Synthetic: does not use and IV drug use: denies; notes in high school he tried things at parties but not sure what        Marriages: 0    Current Relationships: Single    Children: 0        Education: high school diploma/GED     Occupation: individual, not currently working    Living Situation: mother and father       Abuse/Trauma: History of physical abuse: no and History of sexual abuse: no      Family History:    Family History   Problem Relation Age of Onset   • Anxiety disorder Mother    • Depression Mother    • Diabetes Father    • Fibromyalgia Father    • Anxiety disorder Father    • Depression Father    • Depression Sister    • Psychosis Paternal Grandmother    • Psychosis Other    • Suicidality Neg Hx          Past Medical and Surgical History:    Past Medical History   Diagnosis Date   • Alcohol use disorder, mild, abuse 3/14/2017   • Anxiety    • Bipolar affective disorder, depressed, severe, with psychotic behavior 3/14/2017   • Depression      History reviewed. No pertinent past surgical history.  Allergies:  Review of patient's allergies indicates no known allergies.  Prescriptions Prior to Admission   Medication Sig Dispense Refill Last Dose   • busPIRone (BUSPAR) 10 MG tablet Take 10 mg by mouth 3 (Three) Times a Day.   3/12/2017 at Unknown time   • sertraline  "(ZOLOFT) 100 MG tablet Take 100 mg by mouth Daily.   3/12/2017 at Unknown time       Medical Review Of Systems:  Reviewed review of systems from  Dr. Rios's consult note from today.    Objective     Vital Signs    Temp:  [97.8 °F (36.6 °C)-98.2 °F (36.8 °C)] 97.8 °F (36.6 °C)  Heart Rate:  [] 96  Resp:  [18-20] 18  BP: (140-173)/() 140/84    Physical Exam:   General Appearance: alert, appears stated age and cooperative,  Hygiene:   good  Gait & Station: Normal  Musculoskeletal: No tremors or abnormal involuntary movements    Mental Status Exam:   Cooperation:  Cooperative  Eye Contact:  Fair  Psychomotor Behavior:  Restless  Mood: Sad/Depressed and Anxious/Nervous  Affect:  constricted  Speech:  Normal  Thought Process:  Coherent and Appropriately abstract  Associations: Goal Directed  Thought Content:     Normal   Suicidal:  denies   Homicidal:  denies   Hallucinations:  None   Delusion:  Paranoid  Cognitive Functioning:   Consciousness: awake and alert   Orientation:  Person, Place and Situation   Attention: normal Concentration: \"ldrow\"   Language:  Intact Vocabulary: Average   Short Term Memory: Intact and 3/3   Long Term Memory: Intact   Fund of Knowledge: Average  Reliability:  fair  Insight:  Fair  Judgement:  Fair  Impulse Control:  Fair    Diagnostic Data:    Recent Results (from the past 72 hour(s))   TSH    Collection Time: 03/13/17  4:58 PM   Result Value Ref Range    TSH 1.090 0.460 - 4.680 mIU/mL   T4, Free    Collection Time: 03/13/17  4:58 PM   Result Value Ref Range    Free T4 1.22 0.78 - 2.19 ng/dL   Comprehensive Metabolic Panel    Collection Time: 03/13/17  4:59 PM   Result Value Ref Range    Glucose 100 60 - 100 mg/dL    BUN 15 7 - 21 mg/dL    Creatinine 0.77 0.70 - 1.30 mg/dL    Sodium 141 137 - 145 mmol/L    Potassium 4.4 3.5 - 5.1 mmol/L    Chloride 100 95 - 110 mmol/L    CO2 25.0 22.0 - 31.0 mmol/L    Calcium 9.4 8.4 - 10.2 mg/dL    Total Protein 8.4 6.3 - 8.6 g/dL    Albumin " 4.80 3.40 - 4.80 g/dL    ALT (SGPT) 48 21 - 72 U/L    AST (SGOT) 30 17 - 59 U/L    Alkaline Phosphatase 142 (H) 38 - 126 U/L    Total Bilirubin 0.5 0.2 - 1.3 mg/dL    eGFR Non  Amer 125 77 - 179 mL/min/1.73    Globulin 3.6 (H) 2.3 - 3.5 gm/dL    A/G Ratio 1.3 1.1 - 1.8 g/dL    BUN/Creatinine Ratio 19.5 7.0 - 25.0    Anion Gap 16.0 (H) 5.0 - 15.0 mmol/L   Urinalysis With / Culture If Indicated    Collection Time: 03/13/17  4:59 PM   Result Value Ref Range    Color, UA Yellow Yellow, Straw, Dark Yellow, Olivia    Appearance, UA Cloudy (A) Clear    pH, UA 7.0 5.0 - 9.0    Specific Gravity, UA 1.022 1.003 - 1.030    Glucose, UA Negative Negative    Ketones, UA Negative Negative    Bilirubin, UA Negative Negative    Blood, UA Negative Negative    Protein, UA Negative Negative    Leuk Esterase, UA Negative Negative    Nitrite, UA Negative Negative    Urobilinogen, UA 0.2 E.U./dL 0.2 - 1.0 E.U./dL   Salicylate Level    Collection Time: 03/13/17  4:59 PM   Result Value Ref Range    Salicylate <1.0 (L) 10.0 - 20.0 mg/dL   Acetaminophen Level    Collection Time: 03/13/17  4:59 PM   Result Value Ref Range    Acetaminophen <10.0 (L) 10.0 - 30.0 mcg/mL   Ethanol    Collection Time: 03/13/17  4:59 PM   Result Value Ref Range    Ethanol <10 0 - 10 mg/dL    Ethanol % <0.010 %   Urine Drug Screen    Collection Time: 03/13/17  4:59 PM   Result Value Ref Range    Amphetamine Screen, Urine Negative Negative    Barbiturates Screen, Urine Negative Negative    Benzodiazepine Screen, Urine Negative Negative    Cocaine Screen, Urine Negative Negative    Methadone Screen, Urine Negative Negative    Opiate Screen Negative Negative    Oxycodone Screen, Urine Negative Negative    THC, Screen, Urine Negative Negative   CBC Auto Differential    Collection Time: 03/13/17  4:59 PM   Result Value Ref Range    WBC 12.99 (H) 3.20 - 9.80 10*3/mm3    RBC 5.90 (H) 4.37 - 5.74 10*6/mm3    Hemoglobin 17.3 13.7 - 17.3 g/dL    Hematocrit 49.2 (H) 39.0  - 49.0 %    MCV 83.4 80.0 - 98.0 fL    MCH 29.3 26.5 - 34.0 pg    MCHC 35.2 31.5 - 36.3 g/dL    RDW 13.4 11.5 - 14.5 %    RDW-SD 40.7 35.1 - 43.9 fl    MPV 9.1 8.0 - 12.0 fL    Platelets 227 150 - 450 10*3/mm3    Neutrophil % 76.8 37.0 - 80.0 %    Lymphocyte % 15.3 10.0 - 50.0 %    Monocyte % 6.0 0.0 - 12.0 %    Eosinophil % 0.7 0.0 - 7.0 %    Basophil % 0.4 0.0 - 2.0 %    Immature Grans % 0.8 (H) 0.0 - 0.5 %    Neutrophils, Absolute 9.97 (H) 2.00 - 8.60 10*3/mm3    Lymphocytes, Absolute 1.99 0.60 - 4.20 10*3/mm3    Monocytes, Absolute 0.78 0.00 - 0.90 10*3/mm3    Eosinophils, Absolute 0.09 0.00 - 0.70 10*3/mm3    Basophils, Absolute 0.05 0.00 - 0.20 10*3/mm3    Immature Grans, Absolute 0.11 (H) 0.00 - 0.02 10*3/mm3     No results found.      Patient Strengths: average or above intelligence, capable of independent living, communication skills, patient is voluntary, is willing to work on problems     Patient Barriers: low self esteem    Assessment/Plan     Principal Problem:    Bipolar affective disorder, depressed, severe, with psychotic behavior  Active Problems:    Suicidal ideations    Alcohol use disorder, mild, abuse      Treatment Plan:    1) Will admit patient to the behavioral health unit at [facility] to ensure patient safety.  2) Patient will be provided treatment with the unit milieu, activities, therapies and psychopharmacological management.  3) Patient placed on  Q15 minute checks  and Suicide precautions.  4) Dr. Rios consulted for management of medical co-morbidities.  5) Will order following labs: none  6) Will restart patient on the following psychiatric home meds: none  7) Will make the following medication changes: geodon 60mg qpm dinner  8) Will begin discharge planning as appropriate for patient.  9) Psychotherapy provided for less than 16 minutes.    Treatment plan and medication risks and benefits discussed with: Patient      Estimated Length of Stay: 1 week  Prognosis:  slava Cunningham MD  03/14/17  1:03 PM

## 2017-03-14 NOTE — ED NOTES
"In pt room.  Pt states he is here for \"anxiety and depression\".  While speaking with pt, pt is continuously taps left foot on floor, and intermittently rubs face with hands.  I asked pt is he was suicidal and pt states \"I just wished I wouldn't wake up one more time\"!  \"I just don't trust people......I just want to stay to myself\".  Pt states he has \"felt this way\" over the past 6 months to one year.  Mother states pt is currently being seen by [facility] psychiatrist: Alexia Monk but is not getting \"any better\".   Pt denies homicidal ideation at this time; however, pt did send a text message to his aunt asking where \"Russel\" is and stating he was \"going to kill him with his gun....if he didn't have a gun, then he would kill him with his knife or bare hands\" per parents report to this RN.  Mother states pt has displayed extreme behavioral issues over the past one month, et al, pt was arrested in [facility] for \"sitting on a bench for 3-4 hours....it was really starting to scare people so somebody called the police\".  Mother states pt \"came home\" yesterday.         Nidia Juárez RN  03/13/17 1913    "

## 2017-03-14 NOTE — PLAN OF CARE
Problem: BH Patient Care Overview (Adult)  Goal: Plan of Care Review  Outcome: Ongoing (interventions implemented as appropriate)  Goal: Individualization and Mutuality  Outcome: Ongoing (interventions implemented as appropriate)  Goal: Discharge Needs Assessment  Outcome: Ongoing (interventions implemented as appropriate)    Problem: BH Overarching Goals  Goal: Adheres to Safety Considerations for Self and Others  Outcome: Ongoing (interventions implemented as appropriate)  Goal: Optimized Coping Skills in Response to Life Stressors  Outcome: Ongoing (interventions implemented as appropriate)  Goal: Develops/Participates in Therapeutic [place] to Support Successful Transition  Outcome: Ongoing (interventions implemented as appropriate)

## 2017-03-15 PROBLEM — F40.10 SOCIAL ANXIETY DISORDER: Status: ACTIVE | Noted: 2017-03-15

## 2017-03-15 PROCEDURE — 99232 SBSQ HOSP IP/OBS MODERATE 35: CPT | Performed by: NURSE PRACTITIONER

## 2017-03-15 RX ORDER — FLUOXETINE HYDROCHLORIDE 20 MG/1
20 CAPSULE ORAL DAILY
Status: DISCONTINUED | OUTPATIENT
Start: 2017-03-15 | End: 2017-03-21 | Stop reason: HOSPADM

## 2017-03-15 RX ADMIN — FLUOXETINE 20 MG: 20 CAPSULE ORAL at 16:38

## 2017-03-15 RX ADMIN — HYDROXYZINE PAMOATE 50 MG: 50 CAPSULE ORAL at 18:47

## 2017-03-15 RX ADMIN — ZIPRASIDONE HCL 60 MG: 20 CAPSULE ORAL at 19:03

## 2017-03-15 NOTE — PLAN OF CARE
Problem: BH Patient Care Overview (Adult)  Goal: Plan of Care Review  Outcome: Ongoing (interventions implemented as appropriate)  Goal: Individualization and Mutuality  Outcome: Ongoing (interventions implemented as appropriate)  Goal: Discharge Needs Assessment  Outcome: Ongoing (interventions implemented as appropriate)    Problem: BH Overarching Goals  Goal: Adheres to Safety Considerations for Self and Others  Outcome: Ongoing (interventions implemented as appropriate)  Goal: Optimized Coping Skills in Response to Life Stressors  Outcome: Ongoing (interventions implemented as appropriate)  Goal: Develops/Participates in Therapeutic [place] to Support Successful Transition  Outcome: Ongoing (interventions implemented as appropriate)

## 2017-03-15 NOTE — NURSING NOTE
"Behavior  Anxiety 4 with 10 being the worst.   Depression 7 with 10 being the worst.   SI no  HI no  AVH no    1:1 with patient completed. Patient is guarded and cooperative in his room. Patient makes fair eye contact and is interacting appropriately  with staff.  Patient states \"I had a pretty good day.\"  Patient denies any needs at this time and would not elaborate further.          Intervention  Instructed in medication usage and effects. Medications administered as ordered. Patient encouraged to notify staff of any needs, increased/uncontrolled anxiety/depression, or thoughts to harm self or others.       Response  Patient is agreeable and verbalizes understanding.         Plan  Support offered and will continue to monitor. Q15 minute checks for safety.     "

## 2017-03-15 NOTE — PROGRESS NOTES
"    3/15/2017    Chief Complaint: suicidal ideation, homicidal ideation, paranoia, anxiety and depression    Subjective:  Patient is a 22 y.o. male who was hospitalized for suicidal ideation, homicidal ideation, paranoia, anxiety and depression.   Since yesterday the patient has continued to be labile.  Patient reports that level of hopefulness is poor.  Patient reports medications are effective.  Further history reported: Rory seen in treatment team. States he is living at home with parents and not working. Wants more for his life. Noted to look out window when team members were speaking to him. COLIN Noble reports that there was something that happened with his cousin. He thought his cousin was at the local [employer] store and he took a gun from his home. He feels like like life is passing him by. 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 States he has been hating life since the age of 15. He alludes to stress between he and his domineering father, who is on disability. Had a recent arrest on 3/11/2017 for criminal trespassing.  He has been staying at home most of his days. Staying to himself.     Objective     Vital Signs    Visit Vitals   • /74 (BP Location: Left arm, Patient Position: Sitting)   • Pulse 84   • Temp 97.8 °F (36.6 °C) (Tympanic)   • Resp 18   • Ht 71\" (180.3 cm)   • Wt 283 lb (128 kg)   • SpO2 96%   • BMI 39.47 kg/m2       Physical Exam:   General Appearance: alert, cooperative, overweight and beard, casually dressed,  Hygiene:   fair  Gait & Station: Normal  Musculoskeletal: No tremors or abnormal involuntary movements    Mental Status Exam:   Cooperation:  Evasive  Eye Contact:  Poor  Psychomotor Behavior:  Slow  Mood: Sad/Depressed and Anxious/Nervous  Affect:  Sad and at times tearful  Speech:  Normal  Thought Process:  Coherent  Associations: intact and answers questions with logical answers that are relevant  Thought Content:     apathetic, hopeless   Suicidal:  None   Homicidal:  HI " Homicidal Ideation and HPlan homicidal plan, was going to shoot his cousin for interfering in another persons relationship   Hallucinations:  None   Delusion:  Other suspicious of cousin  Cognitive Functioning:   Consciousness: awake and alert  Reliability:  poor  Insight:  Fair  Judgement:  Impaired  Impulse Control:  Poor     TSH [[id number]] (Normal) Collected: 03/13/17 1658        Specimen: Blood Updated: 03/13/17 1758         TSH 1.090 mIU/mL         T4, Free [[id number]] (Normal) Collected: 03/13/17 1658      Specimen: Blood Updated: 03/13/17 1745         Free T4 1.22 ng/dL         CBC & Differential [[id number]] Collected: 03/13/17 1659      Specimen: Blood Updated: 03/13/17 1715      Narrative:        The following orders were created for panel order CBC & Differential.  Procedure Abnormality Status   --------- ----------- ------   CBC Auto Differential[[id number]] Abnormal Final result       Please view results for these tests on the individual orders.      Comprehensive Metabolic Panel [[id number]] (Abnormal) Collected: 03/13/17 1659      Specimen: Blood Updated: 03/13/17 1730         Glucose 100 mg/dL            BUN 15 mg/dL            Creatinine 0.77 mg/dL            Sodium 141 mmol/L            Potassium 4.4 mmol/L            Chloride 100 mmol/L            CO2 25.0 mmol/L            Calcium 9.4 mg/dL            Total Protein 8.4 g/dL            Albumin 4.80 g/dL            ALT (SGPT) 48 U/L            AST (SGOT) 30 U/L            Alkaline Phosphatase 142 (H) U/L            Total Bilirubin 0.5 mg/dL            eGFR Non African Amer 125 mL/min/1.73            Globulin 3.6 (H) gm/dL            A/G Ratio 1.3 g/dL            BUN/Creatinine Ratio 19.5            Anion Gap 16.0 (H) mmol/L         Urinalysis With / Culture If Indicated [[id number]] (Abnormal) Collected: 03/13/17 1659      Specimen: Urine from Urine, Clean Catch Updated: 03/13/17 1721         Color, UA Yellow            Appearance, UA Cloudy (A)             pH, UA 7.0            Specific Gravity, UA 1.022            Glucose, UA Negative            Ketones, UA Negative            Bilirubin, UA Negative            Blood, UA Negative            Protein, UA Negative            Leuk Esterase, UA Negative            Nitrite, UA Negative            Urobilinogen, UA 0.2 E.U./dL         Narrative:        Urine microscopic not indicated.      Salicylate Level [[id number]] (Abnormal) Collected: 03/13/17 1659      Specimen: Blood Updated: 03/13/17 1730         Salicylate <1.0 (L) mg/dL         Acetaminophen Level [[id number]] (Abnormal) Collected: 03/13/17 1659      Specimen: Blood Updated: 03/13/17 1731         Acetaminophen <10.0 (L) mcg/mL         Ethanol [[id number]] Collected: 03/13/17 1659      Specimen: Blood Updated: 03/13/17 1729         Ethanol <10 mg/dL            Ethanol % <0.010 %         Urine Drug Screen [[id number]] (Normal) Collected: 03/13/17 1659      Specimen: Urine from Urine, Clean Catch Updated: 03/13/17 1742         Amphetamine Screen, Urine Negative            Barbiturates Screen, Urine Negative            Benzodiazepine Screen, Urine Negative            Cocaine Screen, Urine Negative            Methadone Screen, Urine Negative            Opiate Screen Negative            Oxycodone Screen, Urine Negative            THC, Screen, Urine Negative        WBC 12.99 (H) 10*3/mm3                RBC 5.90 (H) 10*6/mm3            Hemoglobin 17.3 g/dL            Hematocrit 49.2 (H) %            Medicine:   Current Facility-Administered Medications:   •  acetaminophen (TYLENOL) tablet 650 mg, 650 mg, Oral, Q4H PRN, Rashad Cunningham MD  •  aluminum-magnesium hydroxide-simethicone (MAALOX/MYLANTA) suspension 30 mL, 30 mL, Oral, Q6H PRN, Rashad Cunningham MD  •  hydrOXYzine (VISTARIL) capsule 50 mg, 50 mg, Oral, Q6H PRN, Rashad Cunningham MD, 50 mg at 03/14/17 0811  •  loperamide (IMODIUM) capsule 2 mg, 2 mg, Oral, 4x Daily PRN, Rashad Cunningham MD  •  magnesium hydroxide  (MILK OF MAGNESIA) suspension 2400 mg/10mL 10 mL, 10 mL, Oral, Daily PRN, Rashad Cunningham MD  •  nicotine (NICODERM CQ) 21 MG/24HR patch 1 patch, 1 patch, Transdermal, Q24H, Rashad Cunningham MD, 1 patch at 03/13/17 2049  •  traZODone (DESYREL) tablet 50 mg, 50 mg, Oral, Nightly PRN, Rashad Cunningham MD  •  ziprasidone (GEODON) capsule 60 mg, 60 mg, Oral, Daily With Dinner, Rashad Cunningham MD, 60 mg at 03/14/17 1805    Diagnoses/Assessment:   Principal Problem:    Bipolar affective disorder, depressed, severe, with psychotic behavior  Active Problems:    Suicidal ideations    Alcohol use disorder, mild, abuse  Social anxiety    Treatment Plan:    1) Will continue care for the patient on the behavioral health unit at [facility] to ensure patient safety.  2) Will continue to provide treatment with the unit milieu, activities, therapies and psychopharmacological management.  3) Patient to be placed on or continued on every 15 minute safety checks & Suicide precautions.  4) Will continue medical management by Dr. Rios's consult.  5) Will order following labs: no new labs, current labs reviewed  6) Will make the following medication changes: Prozac 20 mg daily for depression  7) Will continue discharge planning as appropriate for patient.  8) Psychotherapy provided for less than 16 minutes.    Treatment plan and medication risks and benefits discussed with: Patient and treatment team and Dr. Marisa Mc, SELIN  03/15/17  1:00 PM

## 2017-03-15 NOTE — NURSING NOTE
"Behavior   Anxiety-5  Depression-10   Pain -0  AVH-denies  S/I -states he feels hopeless-thoughts of giving up  H/I -denies    Patient in christian talking with signee.  He is very guarded and needs prompting with open ended questions to answer.  He had sad tearful affect.  He kept his eyes closed most of the conversation and would every now and then have tears run down his face.  Patient states that he feels overwhelmed by stressors of life.  He is not currently working and would like to be.  States that he had been working as a  but was put on medical leave because \"I guess I'm just a crybaby\" and his employer felt he needed to get medical help.  States he has had issues with his cousin -that they used to be like brothers but now he feels like his cousin is a \"coniving snack\" that can't be trusted.  Patient would not go into any detail about what had happened with his cousin and why he now feels that way.  Patient also grieving death of his grandfather from approximately 4 years ago.      Intervention  Instructed in med usage and effects, encouraged to verbalize needs. Meds administered as ordered.  Spoke with patient in 1:1 setting and encouraged him to follow treatment plan and med compliance.  Educated patient on how meds can help with Bipolar Disorder.  Also encouraged patient to make a plan as to how he can get back to working as this has been bothering him.  Another nurse, Aide SHAW, came and joined our conversation as she knows patient and he opened up to her a bit more and did smile occasionally when conversing.  Also educated patient on how serious using any type of illegal drug can be especially at a party where other drugs can be added with his knowledge and the effects these drugs can cause him for a long time.            Response  Verbalized understanding of ordered meds and taking other meds.  Patient did also talk about his spiritual upbringing and the byrd he has with some of the " ideas he was raised by and how he feels personally.  Patient did go to group after talking with us.             Plan  Continue to monitor for safety every 15 minute monitoring checks.  Will continue to encourage patient to think of long term goals and how small steps can achieve them.

## 2017-03-15 NOTE — PLAN OF CARE
Problem: BH Patient Care Overview (Adult)  Goal: Discharge Needs Assessment  Outcome: Ongoing (interventions implemented as appropriate)    Problem: BH Overarching Goals  Goal: Adheres to Safety Considerations for Self and Others  Outcome: Ongoing (interventions implemented as appropriate)  Goal: Optimized Coping Skills in Response to Life Stressors  Outcome: Ongoing (interventions implemented as appropriate)  Goal: Develops/Participates in Therapeutic [place] to Support Successful Transition  Outcome: Ongoing (interventions implemented as appropriate)    Problem: Depression  Goal: Patient will demonstrate the ability to initiate new constructive life skills outside of sessions on a consistent basis.  Outcome: Ongoing (interventions implemented as appropriate)    Problem: Impaired Thinking  Goal: Patient will report diminishing or absence of hallucinations and/or delusions.  Outcome: Ongoing (interventions implemented as appropriate)    Problem: Substance Abuse Issues  Goal: Patient will abstain from mood altering substances.  Outcome: Ongoing (interventions implemented as appropriate)  Goal: Patient will develop insight into how to improve their relationships.  Outcome: Ongoing (interventions implemented as appropriate)  Goal: Patient will improve positive self regard.  Outcome: Ongoing (interventions implemented as appropriate)

## 2017-03-16 PROCEDURE — 99232 SBSQ HOSP IP/OBS MODERATE 35: CPT | Performed by: PSYCHIATRY & NEUROLOGY

## 2017-03-16 RX ORDER — OLANZAPINE 5 MG/1
5 TABLET, ORALLY DISINTEGRATING ORAL ONCE
Status: COMPLETED | OUTPATIENT
Start: 2017-03-16 | End: 2017-03-16

## 2017-03-16 RX ORDER — OLANZAPINE 10 MG/1
10 TABLET ORAL NIGHTLY
Status: DISCONTINUED | OUTPATIENT
Start: 2017-03-16 | End: 2017-03-20

## 2017-03-16 RX ORDER — OLANZAPINE 2.5 MG/1
2.5 TABLET ORAL ONCE
Status: CANCELLED | OUTPATIENT
Start: 2017-03-16

## 2017-03-16 RX ADMIN — FLUOXETINE 20 MG: 20 CAPSULE ORAL at 08:51

## 2017-03-16 RX ADMIN — NICOTINE 1 PATCH: 21 PATCH TRANSDERMAL at 08:51

## 2017-03-16 RX ADMIN — HYDROXYZINE PAMOATE 50 MG: 50 CAPSULE ORAL at 08:54

## 2017-03-16 RX ADMIN — HYDROXYZINE PAMOATE 50 MG: 50 CAPSULE ORAL at 18:50

## 2017-03-16 RX ADMIN — OLANZAPINE 5 MG: 5 TABLET, ORALLY DISINTEGRATING ORAL at 17:04

## 2017-03-16 RX ADMIN — OLANZAPINE 10 MG: 10 TABLET, FILM COATED ORAL at 20:21

## 2017-03-16 RX ADMIN — TRAZODONE HYDROCHLORIDE 50 MG: 50 TABLET ORAL at 21:09

## 2017-03-16 NOTE — NURSING NOTE
Behavior   Anxiety 8  Depression 8  Pain 0  AVH no  S/I no  H/I no    Alert, intermittent eye contact, speech slow, relates good appetite, poor sleep pattern, compliant with meds.        Intervention  Instructed in med usage and effects, encouraged to verbalize needs. Meds administered as ordered.          Response  Verbalized understanding           Plan  Continue to monitor for safety/  every 15 minute monitoring checks.

## 2017-03-16 NOTE — NURSING NOTE
"Behavior   Anxiety 8  Depression 8  Pain 0  AVH no  S/I no  H/I no    Pt pacing in the hallways and in his room, pt appears anxious AEB inability to sit still, tearfulness, rubbing his hands through his hair and over his face repeatedly. Pt states, \"I'm crazy, I don't know what to do\" Pt state he did sleep better last night.        Intervention  Instructed in med usage and effects, encouraged to verbalize needs. Meds administered as ordered.  Pt takes medications well        Response  Verbalized understanding and states that he will notify staff of any needs/concerns          Plan  Continue to monitor for safety/  every 15 minute monitoring checks.    "

## 2017-03-16 NOTE — PLAN OF CARE
Problem: Risk for Self Injury/Neglect  Goal: Treatment Goal: Remain safe during length of stay, learn and adopt new coping skills, and be free of self-injurious ideation, impulses and acts at the time of discharge  Outcome: Ongoing (interventions implemented as appropriate)  Goal: Verbalize thoughts and feelings associated with:  Outcome: Ongoing (interventions implemented as appropriate)  Goal: Refrain from harming self  Outcome: Ongoing (interventions implemented as appropriate)  Goal: Attend and participate in unit activities, including therapeutic, recreational, and educational groups  Outcome: Ongoing (interventions implemented as appropriate)  Goal: Recognize maladaptive responses and adopt new coping mechanisms  Outcome: Ongoing (interventions implemented as appropriate)  Goal: Complete daily ADLs, including personal hygiene independently, as able  Outcome: Ongoing (interventions implemented as appropriate)    Problem:  Patient Care Overview (Adult)  Goal: Plan of Care Review  Outcome: Ongoing (interventions implemented as appropriate)  Goal: Discharge Needs Assessment  Outcome: Ongoing (interventions implemented as appropriate)    Problem:  Overarching Goals  Goal: Adheres to Safety Considerations for Self and Others  Outcome: Ongoing (interventions implemented as appropriate)  Goal: Optimized Coping Skills in Response to Life Stressors  Outcome: Ongoing (interventions implemented as appropriate)  Goal: Develops/Participates in Therapeutic [place] to Support Successful Transition  Outcome: Ongoing (interventions implemented as appropriate)    Problem: Substance Abuse Issues  Goal: Patient will abstain from mood altering substances.  Outcome: Ongoing (interventions implemented as appropriate)  Goal: Patient will develop insight into how to improve their relationships.  Outcome: Ongoing (interventions implemented as appropriate)  Goal: Patient will improve positive self regard.  Outcome: Ongoing  (interventions implemented as appropriate)

## 2017-03-16 NOTE — PROGRESS NOTES
3/16/2017    Chief Complaint: suicidal ideation, paranoia, anxiety and depression    Subjective:  Patient is a 22 y.o. male who was hospitalized for suicidal ideation, paranoia, anxiety and depression.   Since yesterday the patient has been more edgy and anxious than earlier.  He appears a bit more paranoid and more frustrated.  Patient reports medications are tolerable.      Objective     Vital Signs    Temp:  [97.8 °F (36.6 °C)-98.5 °F (36.9 °C)] 98.5 °F (36.9 °C)  Heart Rate:  [] 85  Resp:  [18] 18  BP: (137-140)/(79-80) 140/80    Physical Exam:   General Appearance: alert, appears stated age  Hygiene:   good  Gait & Station: Normal  Musculoskeletal: No tremors or abnormal involuntary movements    Mental Status Exam:   Cooperation:  Guarded  Eye Contact:  Downcast  Psychomotor Behavior:  Restless  Mood: Dysphoric  Affect:  mood-congruent  Speech:  Normal  Thought Process:  Coherent  Associations: Goal Directed  Thought Content:     Normal   Suicidal:  None   Homicidal:  None   Hallucinations:  None   Delusion:  Paranoid  Cognitive Functioning:   Consciousness: awake, alert and oriented  Reliability:  poor  Insight:  Poor  Judgement:  Poor  Impulse Control:  Poor    Lab Results (last 24 hours)     ** No results found for the last 24 hours. **        Imaging Results (last 24 hours)     ** No results found for the last 24 hours. **          Medicine:   Current Facility-Administered Medications:   •  acetaminophen (TYLENOL) tablet 650 mg, 650 mg, Oral, Q4H PRN, Rashad Cunningham MD  •  aluminum-magnesium hydroxide-simethicone (MAALOX/MYLANTA) suspension 30 mL, 30 mL, Oral, Q6H PRN, Rashad Cunningham MD  •  FLUoxetine (PROzac) capsule 20 mg, 20 mg, Oral, Daily, Karolina Mc, APRN, 20 mg at 03/16/17 0851  •  hydrOXYzine (VISTARIL) capsule 50 mg, 50 mg, Oral, Q6H PRN, Rashad Cunningham MD, 50 mg at 03/16/17 0854  •  loperamide (IMODIUM) capsule 2 mg, 2 mg, Oral, 4x Daily PRN, Rashad Cunningham MD  •   magnesium hydroxide (MILK OF MAGNESIA) suspension 2400 mg/10mL 10 mL, 10 mL, Oral, Daily PRN, Rashad Cunningham MD  •  nicotine (NICODERM CQ) 21 MG/24HR patch 1 patch, 1 patch, Transdermal, Q24H, Rashad Cunningham MD, 1 patch at 03/16/17 0868  •  traZODone (DESYREL) tablet 50 mg, 50 mg, Oral, Nightly PRN, Rashad Cunningham MD  •  ziprasidone (GEODON) capsule 60 mg, 60 mg, Oral, Daily With Dinner, Rashad Cunningham MD, 60 mg at 03/15/17 1903    Diagnoses/Assessment:   Principal Problem:    Bipolar affective disorder, depressed, severe, with psychotic behavior  Active Problems:    Suicidal ideations    Alcohol use disorder, mild, abuse    Social anxiety disorder      Treatment Plan:    1) Will continue care for the patient on the behavioral health unit at [facility] to ensure patient safety.  2) Will continue to provide treatment with the unit milieu, activities, therapies and psychopharmacological management.  3) Patient to be placed on or continued on  Q15 minute checks  and Suicide precautions.  4) Will continue medical management by Dr. Rios.  5) Will order following labs: none  6) Will make the following medication changes: will discontinue the geodon and start zyprexa 10mg qhs and cont prozac 20mg dailyl  7) Will continue discharge planning as appropriate for patient.  8) Psychotherapy provided: none    Treatment plan and medication risks and benefits discussed with: Patient    Rashad Cunningham MD  03/16/17  2:31 PM

## 2017-03-16 NOTE — PLAN OF CARE
Problem: Risk for Self Injury/Neglect  Goal: Treatment Goal: Remain safe during length of stay, learn and adopt new coping skills, and be free of self-injurious ideation, impulses and acts at the time of discharge  Outcome: Ongoing (interventions implemented as appropriate)  Goal: Verbalize thoughts and feelings associated with:  Outcome: Ongoing (interventions implemented as appropriate)  Goal: Refrain from harming self  Outcome: Ongoing (interventions implemented as appropriate)  Goal: Attend and participate in unit activities, including therapeutic, recreational, and educational groups  Outcome: Ongoing (interventions implemented as appropriate)  Goal: Recognize maladaptive responses and adopt new coping mechanisms  Outcome: Ongoing (interventions implemented as appropriate)  Goal: Complete daily ADLs, including personal hygiene independently, as able  Outcome: Ongoing (interventions implemented as appropriate)    Problem:  Patient Care Overview (Adult)  Goal: Plan of Care Review  Outcome: Ongoing (interventions implemented as appropriate)    Problem:  Overarching Goals  Goal: Adheres to Safety Considerations for Self and Others  Outcome: Ongoing (interventions implemented as appropriate)  Goal: Optimized Coping Skills in Response to Life Stressors  Outcome: Ongoing (interventions implemented as appropriate)  Goal: Develops/Participates in Therapeutic [place] to Support Successful Transition  Outcome: Ongoing (interventions implemented as appropriate)    Problem: Depression  Goal: Patient will demonstrate the ability to initiate new constructive life skills outside of sessions on a consistent basis.  Outcome: Ongoing (interventions implemented as appropriate)    Problem: Impaired Thinking  Goal: Patient will report diminishing or absence of hallucinations and/or delusions.  Outcome: Ongoing (interventions implemented as appropriate)    Problem: Substance Abuse Issues  Goal: Patient will abstain from mood  altering substances.  Outcome: Ongoing (interventions implemented as appropriate)  Goal: Patient will develop insight into how to improve their relationships.  Outcome: Ongoing (interventions implemented as appropriate)  Goal: Patient will improve positive self regard.  Outcome: Ongoing (interventions implemented as appropriate)

## 2017-03-17 PROCEDURE — 99232 SBSQ HOSP IP/OBS MODERATE 35: CPT | Performed by: NURSE PRACTITIONER

## 2017-03-17 RX ORDER — METHYLPHENIDATE HYDROCHLORIDE 5 MG/1
5 TABLET ORAL DAILY
Status: DISCONTINUED | OUTPATIENT
Start: 2017-03-17 | End: 2017-03-18

## 2017-03-17 RX ADMIN — OLANZAPINE 10 MG: 10 TABLET, FILM COATED ORAL at 21:44

## 2017-03-17 RX ADMIN — FLUOXETINE 20 MG: 20 CAPSULE ORAL at 08:44

## 2017-03-17 RX ADMIN — HYDROXYZINE PAMOATE 50 MG: 50 CAPSULE ORAL at 14:30

## 2017-03-17 RX ADMIN — TRAZODONE HYDROCHLORIDE 50 MG: 50 TABLET ORAL at 21:44

## 2017-03-17 RX ADMIN — NICOTINE 1 PATCH: 21 PATCH TRANSDERMAL at 08:44

## 2017-03-17 RX ADMIN — METHYLPHENIDATE HYDROCHLORIDE 5 MG: 5 TABLET ORAL at 14:30

## 2017-03-17 NOTE — NURSING NOTE
"Pt became very anxious this afternoon and was having difficulty calming self. Pt was sitting on his bed, rubbing his head, rocking back and forth. Staff went to patient's room in an attempt to help calm patient. Pt was offered PRN medication for anxiety which patient accepted. Staff sit with patient and patient started to talk with staff and open up about some of the things that were bothering him. Patient states he feels guilt if he does anything that he knows his parents would not approve up. Pt gave example of, \"If I even go buy a 6 pack of beer, I feel awful\" \"I get stuff in my mind and I can't let it go\". Pt admits that he talked with his cousin and shared his most personal feelings and experiences. Pt states that \"then he (cousin) started acting strange and I was just like, fine, I'm done with you\". Pt admits that he may have been paranoid at that time but still does not feel the same way about his cousin, pt expressed concern that his cousin would \"tell everybody\".   Pt struggles with being too hard on himself and feels like an \"epic failure\". Staff allowed patient to vent his feelings and discussed positive ways to deal with the negative thoughts that he was feeling. Pt stated that he felt much better after talking with staff and thanked staff for staying with him and talking. Pt encouraged to come to staff at any time.   "

## 2017-03-17 NOTE — PLAN OF CARE
Problem: BH Patient Care Overview (Adult)  Goal: Plan of Care Review  Outcome: Ongoing (interventions implemented as appropriate)  Goal: Interdisciplinary Rounds/Family Conference  Outcome: Ongoing (interventions implemented as appropriate)  Goal: Individualization and Mutuality  Outcome: Ongoing (interventions implemented as appropriate)  Goal: Discharge Needs Assessment  Outcome: Ongoing (interventions implemented as appropriate)    Problem:  Overarching Goals  Goal: Adheres to Safety Considerations for Self and Others  Outcome: Ongoing (interventions implemented as appropriate)  Goal: Optimized Coping Skills in Response to Life Stressors  Outcome: Ongoing (interventions implemented as appropriate)  Goal: Develops/Participates in Therapeutic [place] to Support Successful Transition  Outcome: Ongoing (interventions implemented as appropriate)

## 2017-03-17 NOTE — NURSING NOTE
Behavior   Anxiety 10  Depression 10  Pain 0  AVH no  S/I no  H/I no      Alert/interactive with staff, occasional interaction with peers, food hygiene, relates good appetite, and continues poor sleep pattern, compliant with med regime.      Intervention  Instructed in med usage and effects, encouraged to verbalize needs. Meds administered as ordered.          Response  Verbalized understanding           Plan  Continue to monitor for safety/  every 15 minute monitoring checks.

## 2017-03-17 NOTE — PROGRESS NOTES
"    3/17/2017    Chief Complaint: suicidal ideation, paranoia, anxiety and depression    Subjective:  Patient is a 22 y.o. male who was hospitalized for suicidal ideation, paranoia, anxiety and depression.   Since yesterday the patient has been worsening per his report.  Patient reports that level of hopefulness is sometimes its a 1 and sometimes its a 3.  Patient reports medications are tolerable.  Further history reported: \"My thoughts are still overwhelming. I also took trazodone last night to help me sleep.\" He denies any problems with appetite or nausea. Thinks he slept about 6-8 hours. He states he sometimes has problems sleeping even back to when he was a child. States his mind \"never shuts down.\" Goes into discribing his daily frustrations. \"I have to be doing something that stimulates my mind. I get side tracked if it is a mundane task. My mom has to stay on me  to get things done. If my mom leaves for the day I will start to clean my room and then get side tracked. I was on Adderall before. 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 My mom doesn't understand why they don't put me back on it. I still had some problems but it was better.\" He stopped Adderall at age 20. \"I noticed a big change. My aunt told my mom that I need to be on it when I was in school. It also helped with the depression. I would sleep well at night after I was on it for a while. If I wake up in the middle of the night, my mind just starts racing.\" He starts numerous projects and then is unable to keep interest long enough to finish them. Started on Adderall at age 19. Was on for about one year. Dr. Moss stopped it due to the report of paranoia. \"The Adderall did not make my paranoia worse.\" Had a recent arrest due to impulsive act.     Objective     Vital Signs    Visit Vitals   • /89 (BP Location: Left arm, Patient Position: Lying)   • Pulse 68   • Temp 98.7 °F (37.1 °C) (Oral)   • " "Resp 18   • Ht 71\" (180.3 cm)   • Wt 283 lb (128 kg)   • SpO2 99%   • BMI 39.47 kg/m2       Physical Exam:   General Appearance: alert, appears stated age, cooperative and overweight,  Hygiene:   fair  Gait & Station: Normal  Musculoskeletal: No tremors or abnormal involuntary movements    Mental Status Exam:   Cooperation:  Cooperative  Eye Contact:  Downcast  Psychomotor Behavior:  is shaking his legs and rubbing his hands during the interview  Mood: Sad/Depressed, Anxious/Nervous and Worried  Affect:  flat  Speech:  Normal  Thought Process:  Coherent  Associations: intact  Thought Content:     alert and orient. Answers given are relevant   Suicidal:  None   Homicidal:  None   Hallucinations:  None   Delusion:  Paranoid  Cognitive Functioning:   Consciousness: awake and alert  Reliability:  fair  Insight:  Fair  Judgement:  Fair  Impulse Control:  Good    Lab Results (last 24 hours)     ** No results found for the last 24 hours. **        Imaging Results (last 24 hours)     ** No results found for the last 24 hours. **          Medicine:   Current Facility-Administered Medications:   •  acetaminophen (TYLENOL) tablet 650 mg, 650 mg, Oral, Q4H PRN, Rashad Cunningham MD  •  aluminum-magnesium hydroxide-simethicone (MAALOX/MYLANTA) suspension 30 mL, 30 mL, Oral, Q6H PRN, Rashad Cunningham MD  •  FLUoxetine (PROzac) capsule 20 mg, 20 mg, Oral, Daily, Karolina Mc, APRN, 20 mg at 03/17/17 0844  •  hydrOXYzine (VISTARIL) capsule 50 mg, 50 mg, Oral, Q6H PRN, Rashad Cunningham MD, 50 mg at 03/16/17 1850  •  loperamide (IMODIUM) capsule 2 mg, 2 mg, Oral, 4x Daily PRN, Rashad Cuninngham MD  •  magnesium hydroxide (MILK OF MAGNESIA) suspension 2400 mg/10mL 10 mL, 10 mL, Oral, Daily PRN, Rashad Cunningham MD  •  nicotine (NICODERM CQ) 21 MG/24HR patch 1 patch, 1 patch, Transdermal, Q24H, Rashad Cunningham MD, 1 patch at 03/17/17 0844  •  OLANZapine (zyPREXA) tablet 10 mg, 10 mg, Oral, Nightly, Rashad Cunningham" MD, 10 mg at 03/16/17 2021  •  traZODone (DESYREL) tablet 50 mg, 50 mg, Oral, Nightly PRN, Rashad Cunningham MD, 50 mg at 03/16/17 2109    Diagnoses/Assessment:   Principal Problem:    Bipolar affective disorder, depressed, severe, with psychotic behavior  Active Problems:    Suicidal ideations    Alcohol use disorder, mild, abuse    Social anxiety disorder      Treatment Plan:    1) Will continue care for the patient on the behavioral health unit at [facility] to ensure patient safety.  2) Will continue to provide treatment with the unit milieu, activities, therapies and psychopharmacological management.  3) Patient to be placed on or continued on every 15 minute safety checks & Suicide precautions.  4) Will continue medical management by Dr. Rios's consult.  5) Will order following labs: no new labs  6) Will make the following medication changes: Ritalin 5 mg daily  7) Will continue discharge planning as appropriate for patient.  8) Psychotherapy provided for less than 16 minutes.    Treatment plan and medication risks and benefits discussed with: Patient and Dr. Marisa Mc, SELIN  03/17/17  10:39 AM

## 2017-03-17 NOTE — NURSING NOTE
"Behavior   Anxiety 7  Depression 5  Pain 0  AVH no  S/I no  H/I no    Pt restless, anxious AEB pacing the hallways, difficulty sitting still, bouncing his legs when seated and rubbing his face. Pt states he slept better last night with the aid of Trazodone. Pt withdrawn, quiet with poor eye contact, pt avoids social contact whenever possible. Pt guarded with staff and peers. Pt continues to state,\"I'm just a weirdo\"          Intervention  Instructed in med usage and effects. Meds administered as ordered.  Discussed new medication that would be started today. Pt encouraged to talk with staff about his concerns/needs/feelings.         Response  Verbalized understanding but continues to remain withdrawn and aloof          Plan  Continue to monitor for safety/  every 15 minute monitoring checks.    "

## 2017-03-17 NOTE — PLAN OF CARE
Problem: Risk for Self Injury/Neglect  Goal: Treatment Goal: Remain safe during length of stay, learn and adopt new coping skills, and be free of self-injurious ideation, impulses and acts at the time of discharge  Outcome: Ongoing (interventions implemented as appropriate)  Goal: Verbalize thoughts and feelings associated with:  Outcome: Ongoing (interventions implemented as appropriate)  Goal: Refrain from harming self  Outcome: Ongoing (interventions implemented as appropriate)  Goal: Attend and participate in unit activities, including therapeutic, recreational, and educational groups  Outcome: Ongoing (interventions implemented as appropriate)  Goal: Recognize maladaptive responses and adopt new coping mechanisms  Outcome: Ongoing (interventions implemented as appropriate)  Goal: Complete daily ADLs, including personal hygiene independently, as able  Outcome: Ongoing (interventions implemented as appropriate)    Problem:  Patient Care Overview (Adult)  Goal: Plan of Care Review  Outcome: Ongoing (interventions implemented as appropriate)    Problem:  Overarching Goals  Goal: Adheres to Safety Considerations for Self and Others  Outcome: Ongoing (interventions implemented as appropriate)  Goal: Optimized Coping Skills in Response to Life Stressors  Outcome: Ongoing (interventions implemented as appropriate)  Goal: Develops/Participates in Therapeutic [place] to Support Successful Transition  Outcome: Ongoing (interventions implemented as appropriate)    Problem: Depression  Goal: Patient will demonstrate the ability to initiate new constructive life skills outside of sessions on a consistent basis.  Outcome: Ongoing (interventions implemented as appropriate)    Problem: Impaired Thinking  Goal: Patient will report diminishing or absence of hallucinations and/or delusions.  Outcome: Ongoing (interventions implemented as appropriate)    Problem: Substance Abuse Issues  Goal: Patient will abstain from mood  altering substances.  Outcome: Ongoing (interventions implemented as appropriate)  Goal: Patient will develop insight into how to improve their relationships.  Outcome: Ongoing (interventions implemented as appropriate)  Goal: Patient will improve positive self regard.  Outcome: Ongoing (interventions implemented as appropriate)

## 2017-03-18 PROCEDURE — 99232 SBSQ HOSP IP/OBS MODERATE 35: CPT | Performed by: NURSE PRACTITIONER

## 2017-03-18 RX ORDER — METHYLPHENIDATE HYDROCHLORIDE 5 MG/1
5 TABLET ORAL 2 TIMES DAILY
Status: DISCONTINUED | OUTPATIENT
Start: 2017-03-18 | End: 2017-03-21 | Stop reason: HOSPADM

## 2017-03-18 RX ADMIN — METHYLPHENIDATE HYDROCHLORIDE 5 MG: 5 TABLET ORAL at 09:05

## 2017-03-18 RX ADMIN — NICOTINE 1 PATCH: 21 PATCH TRANSDERMAL at 09:08

## 2017-03-18 RX ADMIN — FLUOXETINE 20 MG: 20 CAPSULE ORAL at 09:06

## 2017-03-18 RX ADMIN — OLANZAPINE 10 MG: 10 TABLET, FILM COATED ORAL at 21:02

## 2017-03-18 RX ADMIN — METHYLPHENIDATE HYDROCHLORIDE 5 MG: 5 TABLET ORAL at 13:44

## 2017-03-18 RX ADMIN — TRAZODONE HYDROCHLORIDE 50 MG: 50 TABLET ORAL at 22:40

## 2017-03-18 NOTE — NURSING NOTE
Behavior   Anxiety 5  Depression 5  Pain 0  AVH no  S/I no  H/I no   Pt was sitting in room reading a fishing magazine. Pt reports anxiety and depression, but it has gotten better since admission. Pt reports he has been doing better since admission.  Pt eye contact is fleeting. Speech is appropriate. Pt is still guarded.            Intervention  Instructed in med usage and effects, encouraged to verbalize needs. Meds administered as ordered. RN offered self for expression. RN educated pt on the need for tobacco cessation.          Response  Verbalized understanding. Pt took meds as prescribed. Pt says he will express himself if needed.          Plan  Continue to monitor for safety/  every 15 minute monitoring checks. RN will assess and educate as needed.

## 2017-03-18 NOTE — PLAN OF CARE
Problem: Risk for Self Injury/Neglect  Goal: Treatment Goal: Remain safe during length of stay, learn and adopt new coping skills, and be free of self-injurious ideation, impulses and acts at the time of discharge  Outcome: Ongoing (interventions implemented as appropriate)  Goal: Verbalize thoughts and feelings associated with:  Outcome: Ongoing (interventions implemented as appropriate)  I always encourage patient to express self, but pt remains guarded regarding what has caused all of his negative feelings.  Goal: Refrain from harming self  Outcome: Ongoing (interventions implemented as appropriate)  Pt has denied SI and hasn't attempted to harm self.  Goal: Attend and participate in unit activities, including therapeutic, recreational, and educational groups  Outcome: Ongoing (interventions implemented as appropriate)  Pt attends all unit functions.  Goal: Recognize maladaptive responses and adopt new coping mechanisms  Outcome: Ongoing (interventions implemented as appropriate)  Goal: Complete daily ADLs, including personal hygiene independently, as able  Outcome: Ongoing (interventions implemented as appropriate)  Pt independent with ADLs.    Problem: BH Overarching Goals  Goal: Adheres to Safety Considerations for Self and Others  Outcome: Ongoing (interventions implemented as appropriate)  Goal: Optimized Coping Skills in Response to Life Stressors  Outcome: Ongoing (interventions implemented as appropriate)  Goal: Develops/Participates in Therapeutic [place] to Support Successful Transition  Outcome: Ongoing (interventions implemented as appropriate)    Problem: Depression  Goal: Patient will demonstrate the ability to initiate new constructive life skills outside of sessions on a consistent basis.  Outcome: Ongoing (interventions implemented as appropriate)  Pt constantly given education on positive coping skills.    Problem: Substance Abuse Issues  Goal: Patient will abstain from mood altering  substances.  Outcome: Ongoing (interventions implemented as appropriate)  Pt denies using substances.  Goal: Patient will develop insight into how to improve their relationships.  Outcome: Ongoing (interventions implemented as appropriate)  Pt needs to learn better coping methods and practice them.  Goal: Patient will improve positive self regard.  Outcome: Ongoing (interventions implemented as appropriate)

## 2017-03-18 NOTE — PLAN OF CARE
Problem: BH Patient Care Overview (Adult)  Goal: Individualization and Mutuality  Outcome: Ongoing (interventions implemented as appropriate)  Goal: Discharge Needs Assessment  Outcome: Ongoing (interventions implemented as appropriate)    Problem:  Overarching Goals  Goal: Adheres to Safety Considerations for Self and Others  Outcome: Ongoing (interventions implemented as appropriate)  Goal: Optimized Coping Skills in Response to Life Stressors  Outcome: Ongoing (interventions implemented as appropriate)  Goal: Develops/Participates in Therapeutic [place] to Support Successful Transition  Outcome: Ongoing (interventions implemented as appropriate)

## 2017-03-18 NOTE — NURSING NOTE
"Behavior   Anxiety 6  Depression 5  Pain 0  AVH no  S/I no  H/I no     Pt in common room upon assessment; pt and staff only present.  Pt is neat and clean, shakes hands with staff and greets staff appropriately.  Pt is not relaxed and at once seems tense.  Cheeks are flushed, knee is bouncing and eye contact is hesitant at beginning of conversation.  Pt seems guarded and hesitant to speak about himself.  He admits to *some* relief after administration of ritalin but feels that maybe a dose increase would be better.  Pt states that he \"didn't get much\" out of the groups today.  He states that he doesn't know how to speak about himself in front of strangers.  Pt states that he \"wasn't blessed with a gabbing tongue...\"  It seems difficult for patient to carry a conversation.  He states that he spends a lot of time to himself.      Intervention  Medications administered and assessment complete    Response  Interacted (some) with assessment  Took medication  Ask questions about medications and possible discharge date  Following up after discharge questions    Plan  Will promote and reinforce current treatment plan and encourage involvement in care plan goals. Will provide for safe, calm, quiet environment. Will promote open communication with staff and foster a trusting/working relationship with patient. Will promote participation in groups and therapies and independent reflection.        "

## 2017-03-18 NOTE — PROGRESS NOTES
"    3/18/2017    Chief Complaint: suicidal ideation, paranoia, anxiety and depression    Subjective:  Patient is a 22 y.o. male who was hospitalized for suicidal ideation, paranoia, anxiety and depression.   Since yesterday the patient has been improved, states the ritalin has help his mood and energy level.  Patient reports that level of hopefulness is improving.  Patient reports medications are effective.  Further history reported: He states his thoughts are more positive. He is up walking. States he is thinking about going home because he is better. His eye contact is much improved. Eyes are brighter. Will need to monitor for increase in paranoia with increase of Ritalin.     Objective     Vital Signs    Visit Vitals   • /75 (BP Location: Right arm, Patient Position: Lying)   • Pulse 108   • Temp 99.1 °F (37.3 °C) (Tympanic)   • Resp 20   • Ht 71\" (180.3 cm)   • Wt 283 lb (128 kg)   • SpO2 98%   • BMI 39.47 kg/m2       Physical Exam:   General Appearance: alert, appears stated age and cooperative,  Hygiene:   good  Gait & Station: Normal  Musculoskeletal: No tremors or abnormal involuntary movements    Mental Status Exam:   Cooperation:  Cooperative  Eye Contact:  Good  Psychomotor Behavior:  Appropriate  Mood: Euthymic  Affect:  bright  Speech:  Normal  Thought Process:  Coherent  Associations: Goal Directed  Thought Content:     Normal   Suicidal:  None   Homicidal:  None   Hallucinations:  None   Delusion:  Paranoid  Cognitive Functioning:   Consciousness: awake and alert  Reliability:  fair  Insight:  Fair  Judgement:  improving  Impulse Control:  Good    Lab Results (last 24 hours)     ** No results found for the last 24 hours. **        Imaging Results (last 24 hours)     ** No results found for the last 24 hours. **          Medicine:   Current Facility-Administered Medications:   •  acetaminophen (TYLENOL) tablet 650 mg, 650 mg, Oral, Q4H PRN, Rashad Cunningham MD  •  aluminum-magnesium " hydroxide-simethicone (MAALOX/MYLANTA) suspension 30 mL, 30 mL, Oral, Q6H PRN, Rashad Cunningham MD  •  FLUoxetine (PROzac) capsule 20 mg, 20 mg, Oral, Daily, Karolina Mc APRN, 20 mg at 03/18/17 0906  •  hydrOXYzine (VISTARIL) capsule 50 mg, 50 mg, Oral, Q6H PRN, Rashad Cunningham MD, 50 mg at 03/17/17 1430  •  loperamide (IMODIUM) capsule 2 mg, 2 mg, Oral, 4x Daily PRN, Rashad Cunningham MD  •  magnesium hydroxide (MILK OF MAGNESIA) suspension 2400 mg/10mL 10 mL, 10 mL, Oral, Daily PRN, Rashad Cunningham MD  •  methylphenidate (RITALIN) tablet 5 mg, 5 mg, Oral, BID, Karolina Mc APRN, 5 mg at 03/18/17 1344  •  nicotine (NICODERM CQ) 21 MG/24HR patch 1 patch, 1 patch, Transdermal, Q24H, Rashad Cunningham MD, 1 patch at 03/18/17 0908  •  OLANZapine (zyPREXA) tablet 10 mg, 10 mg, Oral, Nightly, Rashad Cunningham MD, 10 mg at 03/17/17 2144  •  traZODone (DESYREL) tablet 50 mg, 50 mg, Oral, Nightly PRN, Rashad Cunningham MD, 50 mg at 03/17/17 2144    Diagnoses/Assessment:   Principal Problem:    Bipolar affective disorder, depressed, severe, with psychotic behavior  Active Problems:    Suicidal ideations    Alcohol use disorder, mild, abuse    Social anxiety disorder      Treatment Plan:    1) Will continue care for the patient on the behavioral health unit at [facility] to ensure patient safety.  2) Will continue to provide treatment with the unit milieu, activities, therapies and psychopharmacological management.  3) Patient to be placed on or continued on every 15 minute safety checks, suicide precautions.  4) Will continue medical management by Dr. Rios's consult.  5) Will order following labs: no new labs ordered  6) Will make the following medication changes: Increase Ritalin to 5 mg BID at 0800 & 1300 daily  7) Will continue discharge planning as appropriate for patient.  8) Psychotherapy provided: none    Treatment plan and medication risks and benefits discussed with:  Patient    Karolina Maria Teresa Mc, APRN  03/18/17  3:13 PM

## 2017-03-19 PROCEDURE — 93010 ELECTROCARDIOGRAM REPORT: CPT | Performed by: INTERNAL MEDICINE

## 2017-03-19 PROCEDURE — 99232 SBSQ HOSP IP/OBS MODERATE 35: CPT | Performed by: NURSE PRACTITIONER

## 2017-03-19 PROCEDURE — 93005 ELECTROCARDIOGRAM TRACING: CPT | Performed by: NURSE PRACTITIONER

## 2017-03-19 RX ADMIN — FLUOXETINE 20 MG: 20 CAPSULE ORAL at 08:07

## 2017-03-19 RX ADMIN — NICOTINE 1 PATCH: 21 PATCH TRANSDERMAL at 08:09

## 2017-03-19 RX ADMIN — METHYLPHENIDATE HYDROCHLORIDE 5 MG: 5 TABLET ORAL at 15:37

## 2017-03-19 RX ADMIN — TRAZODONE HYDROCHLORIDE 50 MG: 50 TABLET ORAL at 21:52

## 2017-03-19 RX ADMIN — OLANZAPINE 10 MG: 10 TABLET, FILM COATED ORAL at 21:52

## 2017-03-19 RX ADMIN — METHYLPHENIDATE HYDROCHLORIDE 5 MG: 5 TABLET ORAL at 08:07

## 2017-03-19 NOTE — PROGRESS NOTES
"    3/19/2017    Chief Complaint: suicidal ideation, paranoia, anxiety and depression    Subjective:  Patient is a 22 y.o. male who was hospitalized for suicidal ideation, paranoia, anxiety and depression.   Since yesterday the patient has been continuing to improve and having positive response to Ritalin.  Patient reports that level of hopefulness is 8/10.  Patient reports medications are effective.  Further history reported: worried about medications after discharge. Worried about returning to his prior state after leaving hospital. Will require regular outpatient treatment. He had several visitors this evening, seemed to do well when parents were visiting, but was more anxious and starting to rub his hands and shake when his aunt and grandmother visited. Aunt stated that [employer] does not work for Rory, Rory reassured her that he was not [employer] and that the medications he is taking now are helpful. She look at the interviewer and said, \"what you are giving him not is not working. Will his insurance cover for him to go to [place].\" This was when Rory became very restless and told his aunt he did not want to go to [place]. She pressed on and he started to shut down. Later Rory states that he has lived with his aunt in the past. He understands that she loves him and wants the best for him. He was visibly upset before his visitors left.     Objective     Vital Signs    Visit Vitals   • /77 (BP Location: Left arm, Patient Position: Lying)   • Pulse 97   • Temp 96.7 °F (35.9 °C) (Tympanic)   • Resp 18   • Ht 71\" (180.3 cm)   • Wt 282 lb 12.8 oz (128 kg)   • SpO2 96%   • BMI 39.44 kg/m2       Physical Exam:   General Appearance: alert, appears stated age and cooperative,  Hygiene: fair, hair not combed today  Gait & Station: Normal  Musculoskeletal: No tremors or abnormal involuntary movements     Mental Status Exam:   Cooperation: Cooperative  Eye Contact: Good  Psychomotor Behavior: Appropriate, " except as mentioned above.  Mood: fair, until visitors arrived, then was anxious  Affect: bright, until visiting hours  Speech: Normal  Thought Process: Coherent  Associations: Goal Directed  Thought Content: logical  Suicidal: None  Homicidal: None  Hallucinations: None  Delusion: Paranoid  Cognitive Functioning:Consciousness: awake and alert  Reliability: fair  Insight: Fair  Judgement: improving  Impulse Control: Good    Lab Results (last 24 hours)     ** No results found for the last 24 hours. **        Imaging Results (last 24 hours)     ** No results found for the last 24 hours. **          Medicine:   Current Facility-Administered Medications:   •  acetaminophen (TYLENOL) tablet 650 mg, 650 mg, Oral, Q4H PRN, Rashad Cunningham MD  •  aluminum-magnesium hydroxide-simethicone (MAALOX/MYLANTA) suspension 30 mL, 30 mL, Oral, Q6H PRN, Rashad Cunningham MD  •  FLUoxetine (PROzac) capsule 20 mg, 20 mg, Oral, Daily, SELIN Li, 20 mg at 03/19/17 0807  •  hydrOXYzine (VISTARIL) capsule 50 mg, 50 mg, Oral, Q6H PRN, Rashad Cunningham MD, 50 mg at 03/17/17 1430  •  loperamide (IMODIUM) capsule 2 mg, 2 mg, Oral, 4x Daily PRN, Rashad Cunningham MD  •  magnesium hydroxide (MILK OF MAGNESIA) suspension 2400 mg/10mL 10 mL, 10 mL, Oral, Daily PRN, Rashad Cunningham MD  •  methylphenidate (RITALIN) tablet 5 mg, 5 mg, Oral, BID, SELIN Li, 5 mg at 03/19/17 0807  •  nicotine (NICODERM CQ) 21 MG/24HR patch 1 patch, 1 patch, Transdermal, Q24H, Rashad Cunningham MD, 1 patch at 03/19/17 0809  •  OLANZapine (zyPREXA) tablet 10 mg, 10 mg, Oral, Nightly, Rashad Cunningham MD, 10 mg at 03/18/17 2102  •  traZODone (DESYREL) tablet 50 mg, 50 mg, Oral, Nightly PRN, Rashad Cunningham MD, 50 mg at 03/18/17 2240    Diagnoses/Assessment:   Principal Problem:    Bipolar affective disorder, depressed, severe, with psychotic behavior  Active Problems:    Suicidal ideations    Alcohol use disorder, mild,  abuse    Social anxiety disorder      Treatment Plan:  1) Will continue care for the patient on the behavioral health unit at [facility] to ensure patient safety.  2) Will continue to provide treatment with the unit milieu, activities, therapies and psychopharmacological management.  3) Patient to be placed on or continued on every 15 minute safety checks, suicide precautions.  4) Will continue medical management by Dr. Rios's consult.  5) Will order following labs: no new labs ordered  6) Will make the following medication changes: no medication changes  7) Will continue discharge planning as appropriate for patient.  8) Psychotherapy provided: none  9) time with family this evening     Treatment plan and medication risks and benefits discussed with: Patient  Karolina Shoredominick Mc, SELIN  03/19/17  1:14 PM

## 2017-03-19 NOTE — PLAN OF CARE
Problem:  Patient Care Overview (Adult)  Goal: Plan of Care Review  Outcome: Ongoing (interventions implemented as appropriate)  Goal: Individualization and Mutuality  Outcome: Ongoing (interventions implemented as appropriate)    03/19/17 0446   Behavioral Health Screens   Patient Personal Strengths independent living skills   Mutuality/Individual Preferences   What Anxieties, Fears or Concerns Do You Have About Your Health or Care? Medications at discharge/home, follow up counseling       Goal: Discharge Needs Assessment  Outcome: Ongoing (interventions implemented as appropriate)    03/19/17 0446   Discharge Needs Assessment   Concerns To Be Addressed coping/stress concerns;medication concerns   Readmission Within The Last 30 Days no previous admission in last 30 days         Problem:  Overarching Goals  Goal: Adheres to Safety Considerations for Self and Others  Outcome: Ongoing (interventions implemented as appropriate)  Goal: Optimized Coping Skills in Response to Life Stressors  Outcome: Ongoing (interventions implemented as appropriate)  Goal: Develops/Participates in Therapeutic [place] to Support Successful Transition  Outcome: Ongoing (interventions implemented as appropriate)

## 2017-03-19 NOTE — NURSING NOTE
"Behavior   Anxiety 0  Depression 0  Pain 0  AVH no  S/I no  H/I no  Participated in groups. Fair eye contact. Minimal interaction with peers. Pt quiet during meals.smiles at times when questioned. Guarded. Mother in to visit. Restless, aggitated.. Mother states \"He's just tired\"          Intervention  Instructed in med usage and effects, encouraged to verbalize needs. Meds administered as ordered.          Response  Verbalized understanding           Plan  Continue to monitor for safety/  every 15 minute monitoring checks.  "

## 2017-03-19 NOTE — NURSING NOTE
"Behavior   Anxiety 4  Depression 5  Pain 0  AVH no  S/I no  H/I no     Pt in room upon assessment, found journaling.  Pt is clean and kept.  Pt appears startled after staff enters room; staff knocked on door and called name before entering.  Pt greets staff after initial anxiety dissipates.  Throughout conversation pt fidgets and doesn't maintain eye contact--however, pt is much more forthcoming with details and information about self as compared to last shifts' assessment.    Pt states that his anxiety stems from, \"many things..\"  One \"thing\" is when people come close to him, for example when staff enter his room, when people are too close to his cart at a shopping center, or when he \"isn't getting a good vibe.\"  Pt states that his child navas was very difficult at times related to the strictness of his parents and their [employer]ist back ground.  He states that he \"had to walk a narrow line\" and has lots of guilt over mistakes, etc he has made that conflict with \"the narrow line.\"  Pt states that he has had much difficulty in interpersonal relationships because of difficulty in expressing his thoughts and feelings.  He states he wants help in this area and to improve.  He states that the additional, Ritalin helped today, that he feels some calmer.    Pt does smile and laugh some throughout the interview but looks at the floor most of the time.  Pt is concerned about discharge and what his medications will be like.  He states he doesn't want to leave the way he came.  Pt seems more insightful and hopeful this shift.      Intervention  Interview complete, encouraged to verbalize needs.    Response  Interacted with assessment.      Plan  Continue to monitor for safety/  every 15 minute monitoring checks.    "

## 2017-03-19 NOTE — PLAN OF CARE
Problem: Risk for Self Injury/Neglect  Goal: Verbalize thoughts and feelings associated with:  Outcome: Ongoing (interventions implemented as appropriate)  Goal: Refrain from harming self  Outcome: Ongoing (interventions implemented as appropriate)  Goal: Recognize maladaptive responses and adopt new coping mechanisms  Outcome: Ongoing (interventions implemented as appropriate)    Problem: BH Overarching Goals  Goal: Adheres to Safety Considerations for Self and Others  Outcome: Ongoing (interventions implemented as appropriate)  Goal: Optimized Coping Skills in Response to Life Stressors  Outcome: Ongoing (interventions implemented as appropriate)  Goal: Develops/Participates in Therapeutic [place] to Support Successful Transition  Outcome: Ongoing (interventions implemented as appropriate)    Problem: Depression  Goal: Patient will demonstrate the ability to initiate new constructive life skills outside of sessions on a consistent basis.  Outcome: Ongoing (interventions implemented as appropriate)    Problem: Substance Abuse Issues  Goal: Patient will abstain from mood altering substances.  Outcome: Ongoing (interventions implemented as appropriate)

## 2017-03-20 PROBLEM — F90.0 ADHD (ATTENTION DEFICIT HYPERACTIVITY DISORDER), INATTENTIVE TYPE: Status: ACTIVE | Noted: 2017-03-20

## 2017-03-20 PROCEDURE — 99232 SBSQ HOSP IP/OBS MODERATE 35: CPT | Performed by: PSYCHIATRY & NEUROLOGY

## 2017-03-20 RX ADMIN — OLANZAPINE 7.5 MG: 2.5 TABLET, FILM COATED ORAL at 20:44

## 2017-03-20 RX ADMIN — METHYLPHENIDATE HYDROCHLORIDE 5 MG: 5 TABLET ORAL at 08:41

## 2017-03-20 RX ADMIN — NICOTINE 1 PATCH: 21 PATCH TRANSDERMAL at 08:42

## 2017-03-20 RX ADMIN — METHYLPHENIDATE HYDROCHLORIDE 5 MG: 5 TABLET ORAL at 12:41

## 2017-03-20 RX ADMIN — FLUOXETINE 20 MG: 20 CAPSULE ORAL at 08:41

## 2017-03-20 RX ADMIN — HYDROXYZINE PAMOATE 50 MG: 50 CAPSULE ORAL at 19:36

## 2017-03-20 NOTE — NURSING NOTE
"Behavior   Anxiety 5  Depression 5  Pain 0  AVH no  S/I no  H/I no     Pt in room journaling upon assessment.  Pt is not much changed as compared to last shifts' assessment.  Speech is WNL and eye contact is intermittent.  Pt, again, appears anxious on initial start to conversation- flushed cheeks, fidgeting of hands/bouncing of knee, nervous laughter.  Recalls visit with mother, father, aunt, and other family members today.  Seems apprehensive to talk about his visit and admits to anxiety related to going home but states he is hopeful that \"everything will work out right....\"    Pt, overall, seems to be improving.  He interacts more with staff, and while still anxious, seems to be trying to push through his anxiety to get his idea across in conversation.     Pt is still rather focused on discharge and follow up care/medications.  Staff support and MD support assured.    Pt seems to be comforted by this.    Pt states the trazodone really helps his sleep.      Intervention  Assessment complete  Medications given  Questions answered    Response  Took medications  Verbalized understanding  Interacted well for patient     Plan  Continue to reach out to patient  Encourage expression of thoughts, ideas, concerns  Encourage moving out of his room  "

## 2017-03-20 NOTE — PLAN OF CARE
Problem: Risk for Self Injury/Neglect  Goal: Treatment Goal: Remain safe during length of stay, learn and adopt new coping skills, and be free of self-injurious ideation, impulses and acts at the time of discharge  Outcome: Ongoing (interventions implemented as appropriate)  Goal: Verbalize thoughts and feelings associated with:  Outcome: Ongoing (interventions implemented as appropriate)  Pt still guarded.  Goal: Refrain from harming self  Outcome: Ongoing (interventions implemented as appropriate)  Pt has not made any attempt to harm self.  Goal: Attend and participate in unit activities, including therapeutic, recreational, and educational groups  Outcome: Ongoing (interventions implemented as appropriate)  Goal: Recognize maladaptive responses and adopt new coping mechanisms  Outcome: Ongoing (interventions implemented as appropriate)  Needs some more help with coping mechanisms.  Goal: Complete daily ADLs, including personal hygiene independently, as able  Outcome: Ongoing (interventions implemented as appropriate)  Pt is independent with ADLs.    Problem: BH Patient Care Overview (Adult)  Goal: Plan of Care Review  Outcome: Ongoing (interventions implemented as appropriate)  Goal: Interdisciplinary Rounds/Family Conference  Outcome: Ongoing (interventions implemented as appropriate)  Goal: Individualization and Mutuality  Outcome: Ongoing (interventions implemented as appropriate)  Goal: Discharge Needs Assessment  Outcome: Ongoing (interventions implemented as appropriate)    Problem:  Overarching Goals  Goal: Adheres to Safety Considerations for Self and Others  Outcome: Ongoing (interventions implemented as appropriate)  Goal: Optimized Coping Skills in Response to Life Stressors  Outcome: Ongoing (interventions implemented as appropriate)  Goal: Develops/Participates in Therapeutic [place] to Support Successful Transition  Outcome: Ongoing (interventions implemented as appropriate)    Problem:  Depression  Goal: Patient will demonstrate the ability to initiate new constructive life skills outside of sessions on a consistent basis.  Outcome: Ongoing (interventions implemented as appropriate)    Problem: Impaired Thinking  Goal: Patient will report diminishing or absence of hallucinations and/or delusions.  Outcome: Ongoing (interventions implemented as appropriate)  Pt denies both.    Problem: Substance Abuse Issues  Goal: Patient will abstain from mood altering substances.  Outcome: Ongoing (interventions implemented as appropriate)  Pt reports that he has.  Goal: Patient will develop insight into how to improve their relationships.  Outcome: Ongoing (interventions implemented as appropriate)  Goal: Patient will improve positive self regard.  Outcome: Ongoing (interventions implemented as appropriate)

## 2017-03-20 NOTE — PLAN OF CARE
Problem: BH Patient Care Overview (Adult)  Goal: Individualization and Mutuality  Outcome: Ongoing (interventions implemented as appropriate)  Goal: Discharge Needs Assessment  Outcome: Ongoing (interventions implemented as appropriate)    Problem:  Overarching Goals  Goal: Adheres to Safety Considerations for Self and Others  Outcome: Ongoing (interventions implemented as appropriate)  Goal: Optimized Coping Skills in Response to Life Stressors  Outcome: Ongoing (interventions implemented as appropriate)  Goal: Develops/Participates in Therapeutic [place] to Support Successful Transition  Outcome: Ongoing (interventions implemented as appropriate)

## 2017-03-20 NOTE — NURSING NOTE
Behavior   Anxiety 3  Depression 3  Pain 0  AVH no  S/I no  H/I no   Pt states he is doing much better. Pt still will not verbalize what is bothering him, but has a little brighter affect. Eye contact is appropriate and speech is clear and relevant.            Intervention  Instructed in med usage and effects, encouraged to verbalize needs. Meds administered as ordered. Follow-up cared discussed. RN educated Pt on risks for SI and HI and the need to express self early on when experiencing these feelings.          Response  Verbalized understanding.           Plan  Continue to monitor for safety/  every 15 minute monitoring checks. RN will assess and educate as needed.

## 2017-03-20 NOTE — PROGRESS NOTES
3/20/2017    Chief Complaint: suicidal ideation, paranoia, anxiety and depression    Subjective:  Patient is a 22 y.o. male who was hospitalized for suicidal ideation, paranoia, anxiety and depression.   Since yesterday the patient has been doing better.  He notes feeling calmer since starting the ritalin.  His paranoia is better but he is still anxious in social situations.  Patient reports that level of hopefulness is improving.  Patient reports medications are tolerable and effective.  Further history reported: Notes that he feels calmer since starting the ritalin.  He notes it is smoother than the adderall in the past.  He also asks about long acting on discharge.    Objective     Vital Signs    Temp:  [96.7 °F (35.9 °C)-97.6 °F (36.4 °C)] 97.6 °F (36.4 °C)  Heart Rate:  [] 115  Resp:  [20] 20  BP: (131-156)/(60-90) 156/90    Physical Exam:   General Appearance: alert, appears stated age and cooperative,  Hygiene:   good  Gait & Station: Normal  Musculoskeletal: No tremors or abnormal involuntary movements    Mental Status Exam:   Cooperation:  Cooperative  Eye Contact:  Good  Psychomotor Behavior:  Appropriate  Mood: Euthymic  Affect:  normal and improving  Speech:  Normal  Thought Process:  Coherent  Associations: Goal Directed  Thought Content:     Normal   Suicidal:  None   Homicidal:  None   Hallucinations:  None   Delusion:  None  Cognitive Functioning:   Consciousness: awake, alert and oriented  Reliability:  good  Insight:  Good  Judgement:  Good  Impulse Control:  Good    Lab Results (last 24 hours)     ** No results found for the last 24 hours. **        Imaging Results (last 24 hours)     ** No results found for the last 24 hours. **          Medicine:   Current Facility-Administered Medications:   •  acetaminophen (TYLENOL) tablet 650 mg, 650 mg, Oral, Q4H PRN, Rashad Cunningham MD  •  aluminum-magnesium hydroxide-simethicone (MAALOX/MYLANTA) suspension 30 mL, 30 mL, Oral, Q6H PRN, Rashad  MD Marisa  •  FLUoxetine (PROzac) capsule 20 mg, 20 mg, Oral, Daily, SELIN Li, 20 mg at 03/20/17 0841  •  hydrOXYzine (VISTARIL) capsule 50 mg, 50 mg, Oral, Q6H PRN, Rashad Cunningham MD, 50 mg at 03/17/17 1430  •  loperamide (IMODIUM) capsule 2 mg, 2 mg, Oral, 4x Daily PRN, Rashad Cunningham MD  •  magnesium hydroxide (MILK OF MAGNESIA) suspension 2400 mg/10mL 10 mL, 10 mL, Oral, Daily PRN, Rashad Cunningham MD  •  methylphenidate (RITALIN) tablet 5 mg, 5 mg, Oral, BID, SELIN Li, 5 mg at 03/20/17 1241  •  nicotine (NICODERM CQ) 21 MG/24HR patch 1 patch, 1 patch, Transdermal, Q24H, Rashad Cunningham MD, 1 patch at 03/20/17 0842  •  OLANZapine (zyPREXA) tablet 10 mg, 10 mg, Oral, Nightly, Rashad Cunningham MD, 10 mg at 03/19/17 2152  •  traZODone (DESYREL) tablet 50 mg, 50 mg, Oral, Nightly PRN, Rashad Cunningham MD, 50 mg at 03/19/17 2152    Diagnoses/Assessment:   Principal Problem:    Bipolar affective disorder, depressed, severe, with psychotic behavior  Active Problems:    Suicidal ideations    Alcohol use disorder, mild, abuse    Social anxiety disorder    ADHD (attention deficit hyperactivity disorder), inattentive type      Treatment Plan:    1) Will continue care for the patient on the behavioral health unit at [facility] to ensure patient safety.  2) Will continue to provide treatment with the unit milieu, activities, therapies and psychopharmacological management.  3) Patient to be placed on or continued on  Q15 minute checks  and Suicide precautions.  4) Will continue medical management by Dr. Rios.  5) Will order following labs: none  6) Will make the following medication changes: will decrease the zyprexa to 7.5mg qhs to reduce long term risk of metabolic issues.  Will cont. Ritalin and prozac.  7) Will continue discharge planning as appropriate for patient.  8) Psychotherapy provided for less than 16 minutes.  Will plan to do family  session tomorrow and likely discharge after that.    Treatment plan and medication risks and benefits discussed with: Patient    Rashad Cunningham MD  03/20/17  3:45 PM

## 2017-03-21 VITALS
RESPIRATION RATE: 18 BRPM | HEIGHT: 71 IN | BODY MASS INDEX: 39.59 KG/M2 | TEMPERATURE: 97.3 F | SYSTOLIC BLOOD PRESSURE: 123 MMHG | OXYGEN SATURATION: 98 % | WEIGHT: 282.8 LBS | DIASTOLIC BLOOD PRESSURE: 58 MMHG | HEART RATE: 69 BPM

## 2017-03-21 PROCEDURE — 99238 HOSP IP/OBS DSCHRG MGMT 30/<: CPT | Performed by: PSYCHIATRY & NEUROLOGY

## 2017-03-21 RX ORDER — TRAZODONE HYDROCHLORIDE 50 MG/1
50 TABLET ORAL NIGHTLY PRN
Qty: 30 TABLET | Refills: 0 | Status: SHIPPED | OUTPATIENT
Start: 2017-03-21 | End: 2018-06-06

## 2017-03-21 RX ORDER — METHYLPHENIDATE HYDROCHLORIDE 27 MG/1
27 TABLET ORAL DAILY
Qty: 30 TABLET | Refills: 0 | Status: SHIPPED | OUTPATIENT
Start: 2017-03-21 | End: 2018-06-06

## 2017-03-21 RX ORDER — OLANZAPINE 7.5 MG/1
7.5 TABLET ORAL NIGHTLY
Qty: 30 TABLET | Refills: 0 | Status: SHIPPED | OUTPATIENT
Start: 2017-03-21 | End: 2018-06-06

## 2017-03-21 RX ORDER — FLUOXETINE HYDROCHLORIDE 20 MG/1
20 CAPSULE ORAL DAILY
Qty: 30 CAPSULE | Refills: 0 | Status: SHIPPED | OUTPATIENT
Start: 2017-03-21 | End: 2018-06-06

## 2017-03-21 RX ADMIN — HYDROXYZINE PAMOATE 50 MG: 50 CAPSULE ORAL at 12:05

## 2017-03-21 RX ADMIN — METHYLPHENIDATE HYDROCHLORIDE 5 MG: 5 TABLET ORAL at 08:00

## 2017-03-21 RX ADMIN — FLUOXETINE 20 MG: 20 CAPSULE ORAL at 08:00

## 2017-03-21 RX ADMIN — NICOTINE 1 PATCH: 21 PATCH TRANSDERMAL at 08:01

## 2017-03-21 NOTE — SIGNIFICANT NOTE
"   03/21/17 1136   Family Counseling   Time Session Began 1100   Time Session Ended 1130   Total Time (minutes) 30   Participants father;mother   Topic Safety/DC Plan   Session Detail CSW and MD met with pt and his parents to discuss any concerns re: dc and reviewed safety plan.   Patient Response Pt continues to have some anxiety and has difficulty verbalizing his thoughts. However, per pt, his mood is \"drastically better\" and his hopefullness has improved. Pt is goal directed and future focused. Pt stated \"I just want to be independent and not living with my parents.\" Pt's parents were present and supportive, did not voice any specific concerns re dc. CSW and MD discussed ways to keep patient's time occupied until he finds another job. 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 CSW and MD discussed with patient the benefits of trying to continue to apprenticeship now that pt is on medications. CSW discussed follow up plan, and scheduled therapy and medication appt with [facility]. CSW educated pt on Crisis Hotline,a dvised him to call as needed. Pt has participated well in individual and group tx, was med compliant. Insight and judgement are fair, much improved from admission. Pt denies SI,. HI, AVH. Pt denies any paranoia at this time. Plan is to return home with parents. BPRS was complete and pt given Press Ganey to complete at dc.     "

## 2017-03-21 NOTE — NURSING NOTE
Pt ambulated from U for discharge home, pt stable, no s/s of distress noted. All discharge paperwork, prescriptions and follow up appointments reviewed with patient. Pt denies any questions/concerns, verbalized understanding. All personal belongings and prescriptions sent with patient.

## 2017-03-21 NOTE — NURSING NOTE
"Behavior   Anxiety 3  Depression 2  Pain 0  AVH no  S/I no  H/I no    Pt up in hallway, pt appear anxious AEB pacing, restlessness and inability to sit still. Pt interacting with staff appropriately with staff but requires prompting by staff for communication. Pt states he is feeling anxiety about going home today is excited about leaving.         Intervention  Instructed in med usage and effects, encouraged to verbalize needs. Meds administered as ordered.  Patient educated about the importance of continuing all outpatient follow up treatment and medication regime.         Response  Patient verbalized understanding stating, \"I know I have to do it\"          Plan  Continue to monitor for safety/  every 15 minute monitoring checks.    "

## 2017-03-21 NOTE — PLAN OF CARE
Problem: BH Patient Care Overview (Adult)  Goal: Plan of Care Review  Outcome: Ongoing (interventions implemented as appropriate)    03/21/17 0439   Coping/Psychosocial Response Interventions   Plan Of Care Reviewed With patient   Coping/Psychosocial   Patient Agreement with Plan of Care agrees   Patient Care Overview   Progress improving       Goal: Individualization and Mutuality  Outcome: Ongoing (interventions implemented as appropriate)  Goal: Discharge Needs Assessment  Outcome: Ongoing (interventions implemented as appropriate)    Problem:  Overarching Goals  Goal: Adheres to Safety Considerations for Self and Others  Outcome: Ongoing (interventions implemented as appropriate)  Goal: Optimized Coping Skills in Response to Life Stressors  Outcome: Ongoing (interventions implemented as appropriate)  Goal: Develops/Participates in Therapeutic [place] to Support Successful Transition  Outcome: Ongoing (interventions implemented as appropriate)

## 2017-03-21 NOTE — NURSING NOTE
Behavior   Anxiety 4  Depression 2  Pain 0  AVH no  S/I no  H/I no   Pt. requested vistaril earlier in shift and reveals it has helped a little. Maintained eye contact & smiled with interactions. Calm, cooperative, medication compliant. Watching TV with peers & relays he has had a good day & hopes to leave soon.   Intervention  Instructed in med usage and effects, encouraged to verbalize needs. Meds administered as ordered.  Response  Verbalized understanding.  Plan  Continue to monitor for safety/  every 15 minute monitoring checks.

## 2017-03-21 NOTE — DISCHARGE SUMMARY
"Admission Date: 3/13/2017    Discharge Date: 3/21/2017    Psychiatric History: Patient is a 22 y.o. male who presents with suicidal ideation, homicidal ideation, paranoia, anxiety and depression. Onset of symptoms was gradual starting 1-2 years ago. Symptoms have been present on a increasingly more frequent basis. Symptoms are associated with anxiety, depressed mood, irritability and ETOH use issues. Symptoms are aggravated by economic problems and self esteem and cognitive.  Patients symptoms severity is severe. Patient reports that level of hopefulness is 1/10. Patient's symptoms occur in the context of chronic mild depression that has gotten much worse due to no clear situational stressors. In the emergency ER he reported HI towards cousin. He notes dealing with anxiety and depression and some anger issues. He notes parents told him to come to ER. He went long-term b/c he had gun and he did not have conceal carry. He had a gun in the back pocket. \"I was scaring people.\" He notes he was staring at people and was sitting in truck smoking cigs for a few hours at a convenience shop. The first time the police let him go. The second time he was not there very long and the  came. This happened around Friday and Saturday. He notes he will stay at home and the anxiety \"will be crippling.\" He does not feel safe in public. He notes he has felt this way for the last year. He feels people are staring at him and making fun of him. He has not confronted anyone about it. He notes sleeps all day and is up at night. \"Feel like I'm in a big hole.\" He notes \"when I am about to find my way out I get lost again.\" He notes howell were problematic. He notes being depressed for a month or more then \"I'd be all right.\" He notes he does note interact much with any of his friends. He notes some depression has been there for most of his life but for the last 18 months he is very depressed and isolating. He notes avoiding people he knows in " public b/c he has no desire to interact. He notes anhedonia is significant. He has cut back on his hunting. He notes last time was [place] but last time fishing has been about 1 year. He notes he has had issues with his cousin for some time but denies that he wants to go find him and do something to him. The ER notes indicate communication with Aunt asking his location and making statement that he wanted to use gun, knife or hand. He denies any AVH. He notes his mom is his reason for not attempted suicide. He notes 4 years MGF  and that was very difficult for him b/c grief and family issues that started afterward. He notes since the zyprexa last night he feels less paranoid and some calm. He notes having periods of 2-3 days of decreased need for sleep and he will spend time in the garage tinkering with things. He notes nothing gets accomplished but he is active. Denies grandiosity and denies recklessness.    Diagnostic Data:    No results found for this or any previous visit (from the past 168 hour(s)).  No results found.    Summary of Hospital Course:  Patient was admitted to the behavioral health unit at [facility] to ensure patient safety.  Patient was provided treatment with the unit milieu, activities, therapies and psychopharmacological management.  Patient was placed on Q15 minute checks and Suicide.  Dr. Rios was consulted for management of medical co-morbidities.  Patient was restarted on the following psychiatric medications: none.  The following medication changes were made during the hospital stay: Patient was given zyprexa 10mg the night of admission.  He was then changed to geodon 60mg but had worsening of paranoia and he was restarted on zyprexa 10mg qhs and augmented with prozac for his depressive issues.  He over the hosp stay revealed his struggles with being scattered and finding adderall helpful in the past.  He had done well on it but his doc stopped it when patient reported  the paranoia.  He was started on ritalin and increased to 5mg bid and found it very helpful to address his anxiety and his hopefulness.  He had no worsening of paranoia and his zyprexa was decreased to 7.5mg.  He was given Concerta 27mg qam at discharge for smoother effect and less wear off.  We did not have concerta available in the hosp to try it before he left.  He may need dose optimization to ensure good response.   Patient had improvement over the course of the hospital stay and tolerated his medications.  Patient had family session with mom and dad to discuss his family dynamics and it's impact on his anxiety and feeling overwhelmed.  Substance abuse issues were not present.      Patients Condition at Discharge:  Patient is stable for discharge and is not an imminent threat to self or others.  The patient's behavrior was Appropriate.  Patient reported that mood was Euthymic.  Patient's affect was normal.  Patient's thought content was as follows:   Suicidal:  None   Homicidal:  None   Hallucinations:  None   Delusion:  None    Discharge Diagnosis:  Principal Problem:    Bipolar affective disorder, depressed, severe, with psychotic behavior  Active Problems:    Suicidal ideations    Alcohol use disorder, mild, abuse    Social anxiety disorder    ADHD (attention deficit hyperactivity disorder), inattentive type      Discharge Medications:      Your medication list      START taking these medications       Instructions Last Dose Given Next Dose Due    FLUoxetine 20 MG capsule   Commonly known as:  PROzac        Take 1 capsule by mouth Daily.         methylphenidate 27 MG CR tablet   Commonly known as:  CONCERTA        Take 1 tablet by mouth Daily.         OLANZapine 7.5 MG tablet   Commonly known as:  zyPREXA        Take 1 tablet by mouth Every Night.         traZODone 50 MG tablet   Commonly known as:  DESYREL        Take 1 tablet by mouth At Night As Needed for Sleep (insomnia).           STOP taking these  medications          busPIRone 10 MG tablet   Commonly known as:  BUSPAR           sertraline 100 MG tablet   Commonly known as:  ZOLOFT                Where to Get Your Medications      You can get these medications from any pharmacy     Bring a paper prescription for each of these medications    • FLUoxetine 20 MG capsule   • methylphenidate 27 MG CR tablet   • OLANZapine 7.5 MG tablet   • traZODone 50 MG tablet             Justification for multiple antipsychotic medications at discharge:  Not Applicable.    Disposition: Patient was discharged home with family.    Follow-up Information     Follow up with [facility]. Go on 4/17/2017.    Why:  Arrive at 9:30 am for therapy appt with Gayathri Bryan    Medication appt at 11 am with SELIN Musa    Take ID, Ins Card, SS Card and med bottles to follow up    Call Crisis Hotline as needed at [phone]    Contact information:    [address] [id number] [phone]          Time Spent: family sesssion for 30minutes and less than 30minutes for discharge    Rashad Cunningham MD  03/21/17  11:28 AM

## 2018-06-06 ENCOUNTER — OFFICE VISIT (OUTPATIENT)
Dept: FAMILY MEDICINE CLINIC | Facility: CLINIC | Age: 23
End: 2018-06-06

## 2018-06-06 ENCOUNTER — LAB (OUTPATIENT)
Dept: LAB | Facility: OTHER | Age: 23
End: 2018-06-06

## 2018-06-06 VITALS
WEIGHT: 298.6 LBS | HEART RATE: 102 BPM | SYSTOLIC BLOOD PRESSURE: 142 MMHG | HEIGHT: 71 IN | OXYGEN SATURATION: 98 % | BODY MASS INDEX: 41.8 KG/M2 | DIASTOLIC BLOOD PRESSURE: 80 MMHG | TEMPERATURE: 98.1 F

## 2018-06-06 DIAGNOSIS — I10 HYPERTENSION, UNSPECIFIED TYPE: ICD-10-CM

## 2018-06-06 DIAGNOSIS — F40.10 SOCIAL ANXIETY DISORDER: ICD-10-CM

## 2018-06-06 DIAGNOSIS — Z79.899 LONG TERM USE OF DRUG: Chronic | ICD-10-CM

## 2018-06-06 DIAGNOSIS — F31.5 BIPOLAR AFFECTIVE DISORDER, DEPRESSED, SEVERE, WITH PSYCHOTIC BEHAVIOR (HCC): Primary | ICD-10-CM

## 2018-06-06 LAB
ALBUMIN SERPL-MCNC: 4 G/DL (ref 3.2–5.5)
ALBUMIN/GLOB SERPL: 1.1 G/DL (ref 1–3)
ALP SERPL-CCNC: 99 U/L (ref 15–121)
ALT SERPL W P-5'-P-CCNC: 33 U/L (ref 10–60)
AMPHET+METHAMPHET UR QL: NEGATIVE
ANION GAP SERPL CALCULATED.3IONS-SCNC: 8 MMOL/L (ref 5–15)
AST SERPL-CCNC: 28 U/L (ref 10–60)
BARBITURATES UR QL SCN: NEGATIVE
BENZODIAZ UR QL SCN: NEGATIVE
BILIRUB SERPL-MCNC: 0.4 MG/DL (ref 0.2–1)
BILIRUB UR QL STRIP: NEGATIVE
BUN BLD-MCNC: 17 MG/DL (ref 8–25)
BUN/CREAT SERPL: 21.3 (ref 7–25)
CALCIUM SPEC-SCNC: 9 MG/DL (ref 8.4–10.8)
CANNABINOIDS SERPL QL: NEGATIVE
CHLORIDE SERPL-SCNC: 102 MMOL/L (ref 100–112)
CHOLEST SERPL-MCNC: 197 MG/DL (ref 150–200)
CLARITY UR: CLEAR
CO2 SERPL-SCNC: 27 MMOL/L (ref 20–32)
COCAINE UR QL: NEGATIVE
COLOR UR: YELLOW
CREAT BLD-MCNC: 0.8 MG/DL (ref 0.4–1.3)
DEPRECATED RDW RBC AUTO: 44.7 FL (ref 35.1–43.9)
EOSINOPHIL # BLD MANUAL: 0.16 10*3/MM3 (ref 0–0.7)
EOSINOPHIL NFR BLD MANUAL: 2 % (ref 0–7)
ERYTHROCYTE [DISTWIDTH] IN BLOOD BY AUTOMATED COUNT: 14.7 % (ref 11.5–14.5)
GFR SERPL CREATININE-BSD FRML MDRD: 119 ML/MIN/1.73 (ref 77–179)
GLOBULIN UR ELPH-MCNC: 3.5 GM/DL (ref 2.5–4.6)
GLUCOSE BLD-MCNC: 103 MG/DL (ref 70–100)
GLUCOSE UR STRIP-MCNC: NEGATIVE MG/DL
HCT VFR BLD AUTO: 50.8 % (ref 39–49)
HDLC SERPL-MCNC: 35 MG/DL (ref 35–100)
HGB BLD-MCNC: 16.6 G/DL (ref 13.7–17.3)
HGB UR QL STRIP.AUTO: NEGATIVE
KETONES UR QL STRIP: NEGATIVE
LDLC SERPL CALC-MCNC: 127 MG/DL
LDLC/HDLC SERPL: 3.62 {RATIO}
LEUKOCYTE ESTERASE UR QL STRIP.AUTO: NEGATIVE
LYMPHOCYTES # BLD MANUAL: 2.66 10*3/MM3 (ref 0.6–4.2)
LYMPHOCYTES NFR BLD MANUAL: 12 % (ref 0–12)
LYMPHOCYTES NFR BLD MANUAL: 34 % (ref 10–50)
MCH RBC QN AUTO: 27.8 PG (ref 26.5–34)
MCHC RBC AUTO-ENTMCNC: 32.7 G/DL (ref 31.5–36.3)
MCV RBC AUTO: 84.9 FL (ref 80–98)
METHADONE UR QL SCN: NEGATIVE
MONOCYTES # BLD AUTO: 0.94 10*3/MM3 (ref 0–0.9)
NEUTROPHILS # BLD AUTO: 4.07 10*3/MM3 (ref 2–8.6)
NEUTROPHILS NFR BLD MANUAL: 51 % (ref 37–80)
NEUTS BAND NFR BLD MANUAL: 1 % (ref 0–5)
NITRITE UR QL STRIP: NEGATIVE
OPIATES UR QL: NEGATIVE
OXYCODONE UR QL SCN: NEGATIVE
PH UR STRIP.AUTO: 7 [PH] (ref 5.5–8)
PLATELET # BLD AUTO: 205 10*3/MM3 (ref 150–450)
PMV BLD AUTO: 9 FL (ref 8–12)
POTASSIUM BLD-SCNC: 4.3 MMOL/L (ref 3.4–5.4)
PROT SERPL-MCNC: 7.5 G/DL (ref 6.7–8.2)
PROT UR QL STRIP: NEGATIVE
RBC # BLD AUTO: 5.98 10*6/MM3 (ref 4.37–5.74)
RBC MORPH BLD: NORMAL
SMALL PLATELETS BLD QL SMEAR: ADEQUATE
SODIUM BLD-SCNC: 137 MMOL/L (ref 134–146)
SP GR UR STRIP: 1.02 (ref 1–1.03)
T4 FREE SERPL-MCNC: 0.82 NG/DL (ref 0.78–2.19)
TRIGL SERPL-MCNC: 176 MG/DL (ref 35–160)
TSH SERPL DL<=0.05 MIU/L-ACNC: 1.02 MIU/ML (ref 0.46–4.68)
UROBILINOGEN UR QL STRIP: NORMAL
VLDLC SERPL-MCNC: 35.2 MG/DL
WBC MORPH BLD: NORMAL
WBC NRBC COR # BLD: 7.82 10*3/MM3 (ref 3.2–9.8)

## 2018-06-06 PROCEDURE — 84439 ASSAY OF FREE THYROXINE: CPT | Performed by: NURSE PRACTITIONER

## 2018-06-06 PROCEDURE — 80307 DRUG TEST PRSMV CHEM ANLYZR: CPT | Performed by: NURSE PRACTITIONER

## 2018-06-06 PROCEDURE — 80053 COMPREHEN METABOLIC PANEL: CPT | Performed by: NURSE PRACTITIONER

## 2018-06-06 PROCEDURE — 84443 ASSAY THYROID STIM HORMONE: CPT | Performed by: NURSE PRACTITIONER

## 2018-06-06 PROCEDURE — 99213 OFFICE O/P EST LOW 20 MIN: CPT | Performed by: NURSE PRACTITIONER

## 2018-06-06 PROCEDURE — 80061 LIPID PANEL: CPT | Performed by: NURSE PRACTITIONER

## 2018-06-06 PROCEDURE — 85025 COMPLETE CBC W/AUTO DIFF WBC: CPT | Performed by: NURSE PRACTITIONER

## 2018-06-06 PROCEDURE — 81003 URINALYSIS AUTO W/O SCOPE: CPT | Performed by: NURSE PRACTITIONER

## 2018-06-06 RX ORDER — CITALOPRAM 40 MG/1
40 TABLET ORAL DAILY
COMMUNITY
End: 2020-09-08

## 2018-06-06 RX ORDER — QUETIAPINE FUMARATE 100 MG/1
200 TABLET, FILM COATED ORAL NIGHTLY
COMMUNITY
End: 2020-09-08

## 2018-06-06 RX ORDER — CLONAZEPAM 0.5 MG/1
1 TABLET ORAL 2 TIMES DAILY PRN
COMMUNITY

## 2018-06-06 NOTE — PROGRESS NOTES
"Subjective   Rory Alvarado is a 23 y.o. male who presents to the office to establish care, requesting labwork and med refills for HTN and anxiety.  Sees Julieta at [facility] who prescribes him the only three medications he is taking, his next appt with her is tomrorow.  Denies ever having intercourse but wants labs checked \"for everything.\"  Mother present during exam.  Was seeing Ajay prior to his departure.    History of Present Illness     The following portions of the patient's history were reviewed and updated as appropriate: allergies, current medications, past family history, past medical history, past social history, past surgical history and problem list.    Review of Systems   Constitutional: Negative for chills, fatigue and fever.   HENT: Negative for congestion, sneezing, sore throat and trouble swallowing.    Eyes: Negative for visual disturbance.   Respiratory: Negative for cough, chest tightness, shortness of breath and wheezing.    Cardiovascular: Negative for chest pain, palpitations and leg swelling.   Gastrointestinal: Negative for abdominal pain, constipation, diarrhea, nausea and vomiting.   Genitourinary: Negative for dysuria, frequency and urgency.   Musculoskeletal: Negative for neck pain.   Skin: Negative for rash.   Neurological: Negative for dizziness, weakness and headaches.   Psychiatric/Behavioral:        In the past two weeks the pt has not felt down, depressed, hopeless or lost interest in doing things   All other systems reviewed and are negative.      Objective   Physical Exam   Constitutional: He is oriented to person, place, and time. He appears well-developed and well-nourished. He is cooperative.  Non-toxic appearance. He does not have a sickly appearance. He does not appear ill. He appears distressed.   HENT:   Head: Normocephalic and atraumatic.   Right Ear: External ear normal.   Left Ear: External ear normal.   Nose: Nose normal.   Mouth/Throat: " Oropharynx is clear and moist.   Eyes: Conjunctivae and EOM are normal. Pupils are equal, round, and reactive to light. Right eye exhibits no discharge. Left eye exhibits no discharge. No scleral icterus.   Neck: Normal range of motion. Neck supple. No thyromegaly present.   Cardiovascular: Normal rate, regular rhythm, normal heart sounds and intact distal pulses.  Exam reveals no gallop and no friction rub.    No murmur heard.  Pulmonary/Chest: Effort normal and breath sounds normal. No respiratory distress. He has no wheezes. He has no rales.   Abdominal: Soft. Normal appearance and bowel sounds are normal. He exhibits no distension. There is no tenderness. There is no rebound and no guarding.   Musculoskeletal: Normal range of motion. He exhibits no edema.   Lymphadenopathy:     He has no cervical adenopathy.   Neurological: He is alert and oriented to person, place, and time. No cranial nerve deficit. GCS eye subscore is 4. GCS verbal subscore is 5. GCS motor subscore is 6.   Skin: Skin is warm and dry. No rash noted.   Psychiatric: His speech is normal. Judgment and thought content normal. His mood appears anxious. He is agitated. He expresses no homicidal and no suicidal ideation. He expresses no suicidal plans and no homicidal plans.   Dressed appropriately for weather and situation, makes eye contact and engages in conversation; rocks in his chair,   Nursing note and vitals reviewed.      Assessment/Plan   Rory was seen today for establish care.    Diagnoses and all orders for this visit:    Bipolar affective disorder, depressed, severe, with psychotic behavior    Hypertension, unspecified type  -     CBC & Differential; Future  -     Comprehensive Metabolic Panel; Future  -     T4, Free; Future  -     TSH; Future  -     Urinalysis With / Culture If Indicated - Urine, Clean Catch; Future  -     Lipid Panel; Future    Long term use of drug  -     Urine Drug Screen - Urine, Clean Catch; Future    Social  anxiety disorder    Will continue with current medications.  Will obtain fasting labs today.  Reviewed [facility]# [id number].

## 2019-12-10 ENCOUNTER — LAB (OUTPATIENT)
Dept: LAB | Facility: OTHER | Age: 24
End: 2019-12-10

## 2019-12-10 ENCOUNTER — TRANSCRIBE ORDERS (OUTPATIENT)
Dept: GENERAL RADIOLOGY | Facility: CLINIC | Age: 24
End: 2019-12-10

## 2019-12-10 DIAGNOSIS — F20.9 SCHIZOPHRENIA, UNSPECIFIED TYPE (HCC): ICD-10-CM

## 2019-12-10 DIAGNOSIS — F20.9 SCHIZOPHRENIA, UNSPECIFIED TYPE (HCC): Primary | ICD-10-CM

## 2019-12-10 LAB
ALBUMIN SERPL-MCNC: 4.3 G/DL (ref 3.5–5)
ALBUMIN/GLOB SERPL: 1.1 G/DL (ref 1.1–1.8)
ALP SERPL-CCNC: 119 U/L (ref 38–126)
ALT SERPL W P-5'-P-CCNC: 50 U/L
ANION GAP SERPL CALCULATED.3IONS-SCNC: 7 MMOL/L (ref 5–15)
AST SERPL-CCNC: 34 U/L (ref 17–59)
BILIRUB SERPL-MCNC: 0.7 MG/DL (ref 0.2–1.3)
BUN BLD-MCNC: 17 MG/DL (ref 7–23)
BUN/CREAT SERPL: 18.7 (ref 7–25)
CALCIUM SPEC-SCNC: 9.5 MG/DL (ref 8.4–10.2)
CHLORIDE SERPL-SCNC: 102 MMOL/L (ref 101–112)
CHOLEST SERPL-MCNC: 231 MG/DL (ref 150–200)
CO2 SERPL-SCNC: 30 MMOL/L (ref 22–30)
CREAT BLD-MCNC: 0.91 MG/DL (ref 0.7–1.3)
DEPRECATED RDW RBC AUTO: 44.2 FL (ref 37–54)
EOSINOPHIL # BLD MANUAL: 0.14 10*3/MM3 (ref 0–0.4)
EOSINOPHIL NFR BLD MANUAL: 2 % (ref 0.3–6.2)
ERYTHROCYTE [DISTWIDTH] IN BLOOD BY AUTOMATED COUNT: 14.6 % (ref 12.3–15.4)
GFR SERPL CREATININE-BSD FRML MDRD: 102 ML/MIN/1.73 (ref 77–179)
GLOBULIN UR ELPH-MCNC: 3.8 GM/DL (ref 2.3–3.5)
GLUCOSE BLD-MCNC: 137 MG/DL (ref 70–99)
HCT VFR BLD AUTO: 48.6 % (ref 37.5–51)
HDLC SERPL-MCNC: 34 MG/DL (ref 40–59)
HGB BLD-MCNC: 16 G/DL (ref 13–17.7)
LDLC SERPL CALC-MCNC: 157 MG/DL
LDLC/HDLC SERPL: 4.61 {RATIO} (ref 0–3.55)
LYMPHOCYTES # BLD MANUAL: 2.52 10*3/MM3 (ref 0.7–3.1)
LYMPHOCYTES NFR BLD MANUAL: 10 % (ref 5–12)
LYMPHOCYTES NFR BLD MANUAL: 36 % (ref 19.6–45.3)
MCH RBC QN AUTO: 27.4 PG (ref 26.6–33)
MCHC RBC AUTO-ENTMCNC: 32.9 G/DL (ref 31.5–35.7)
MCV RBC AUTO: 83.4 FL (ref 79–97)
MONOCYTES # BLD AUTO: 0.7 10*3/MM3 (ref 0.1–0.9)
NEUTROPHILS # BLD AUTO: 3.64 10*3/MM3 (ref 1.7–7)
NEUTROPHILS NFR BLD MANUAL: 52 % (ref 42.7–76)
PLATELET # BLD AUTO: 209 10*3/MM3 (ref 140–450)
PMV BLD AUTO: 9 FL (ref 6–12)
POTASSIUM BLD-SCNC: 4.6 MMOL/L (ref 3.4–5)
PROT SERPL-MCNC: 8.1 G/DL (ref 6.3–8.6)
RBC # BLD AUTO: 5.83 10*6/MM3 (ref 4.14–5.8)
RBC MORPH BLD: NORMAL
SMALL PLATELETS BLD QL SMEAR: ADEQUATE
SODIUM BLD-SCNC: 139 MMOL/L (ref 137–145)
TRIGL SERPL-MCNC: 201 MG/DL
VLDLC SERPL-MCNC: 40.2 MG/DL
WBC MORPH BLD: NORMAL
WBC NRBC COR # BLD: 7 10*3/MM3 (ref 3.4–10.8)

## 2019-12-10 PROCEDURE — 80061 LIPID PANEL: CPT | Performed by: INTERNAL MEDICINE

## 2019-12-10 PROCEDURE — 82607 VITAMIN B-12: CPT | Performed by: NURSE PRACTITIONER

## 2019-12-10 PROCEDURE — 84443 ASSAY THYROID STIM HORMONE: CPT | Performed by: NURSE PRACTITIONER

## 2019-12-10 PROCEDURE — 80053 COMPREHEN METABOLIC PANEL: CPT | Performed by: INTERNAL MEDICINE

## 2019-12-10 PROCEDURE — 82306 VITAMIN D 25 HYDROXY: CPT | Performed by: NURSE PRACTITIONER

## 2019-12-10 PROCEDURE — 85025 COMPLETE CBC W/AUTO DIFF WBC: CPT | Performed by: INTERNAL MEDICINE

## 2019-12-11 LAB
25(OH)D3 SERPL-MCNC: 23.3 NG/ML (ref 30–100)
TSH SERPL DL<=0.05 MIU/L-ACNC: 1.77 UIU/ML (ref 0.27–4.2)
VIT B12 BLD-MCNC: 654 PG/ML (ref 211–946)

## 2020-05-18 ENCOUNTER — OFFICE VISIT (OUTPATIENT)
Dept: FAMILY MEDICINE CLINIC | Facility: CLINIC | Age: 25
End: 2020-05-18

## 2020-05-18 DIAGNOSIS — M54.6 ACUTE RIGHT-SIDED THORACIC BACK PAIN: Primary | ICD-10-CM

## 2020-05-18 PROCEDURE — 99442 PR PHYS/QHP TELEPHONE EVALUATION 11-20 MIN: CPT | Performed by: NURSE PRACTITIONER

## 2020-05-18 RX ORDER — ETODOLAC 200 MG/1
200 CAPSULE ORAL 2 TIMES DAILY PRN
Qty: 30 CAPSULE | Refills: 0 | Status: SHIPPED | OUTPATIENT
Start: 2020-05-18

## 2020-05-18 RX ORDER — BACLOFEN 10 MG/1
10 TABLET ORAL 3 TIMES DAILY PRN
Qty: 30 TABLET | Refills: 0 | Status: SHIPPED | OUTPATIENT
Start: 2020-05-18

## 2020-05-19 ENCOUNTER — TELEPHONE (OUTPATIENT)
Dept: FAMILY MEDICINE CLINIC | Facility: CLINIC | Age: 25
End: 2020-05-19

## 2020-05-19 ENCOUNTER — LAB (OUTPATIENT)
Dept: LAB | Facility: OTHER | Age: 25
End: 2020-05-19

## 2020-05-19 DIAGNOSIS — R82.998 DARK URINE: ICD-10-CM

## 2020-05-19 DIAGNOSIS — R10.9 RIGHT FLANK PAIN: ICD-10-CM

## 2020-05-19 DIAGNOSIS — E55.9 VITAMIN D DEFICIENCY: ICD-10-CM

## 2020-05-19 DIAGNOSIS — E78.2 MIXED HYPERLIPIDEMIA: ICD-10-CM

## 2020-05-19 DIAGNOSIS — M54.6 ACUTE RIGHT-SIDED THORACIC BACK PAIN: Primary | ICD-10-CM

## 2020-05-19 DIAGNOSIS — M54.6 ACUTE RIGHT-SIDED THORACIC BACK PAIN: ICD-10-CM

## 2020-05-19 DIAGNOSIS — R73.9 HYPERGLYCEMIA: ICD-10-CM

## 2020-05-19 LAB
ALBUMIN SERPL-MCNC: 4.4 G/DL (ref 3.5–5)
ALBUMIN/GLOB SERPL: 1.1 G/DL (ref 1.1–1.8)
ALP SERPL-CCNC: 151 U/L (ref 38–126)
ALT SERPL W P-5'-P-CCNC: 93 U/L
ANION GAP SERPL CALCULATED.3IONS-SCNC: 7 MMOL/L (ref 5–15)
AST SERPL-CCNC: 60 U/L (ref 17–59)
BASOPHILS # BLD MANUAL: 0.09 10*3/MM3 (ref 0–0.2)
BASOPHILS NFR BLD AUTO: 1 % (ref 0–1.5)
BILIRUB SERPL-MCNC: 0.6 MG/DL (ref 0.2–1.3)
BILIRUB UR QL STRIP: NEGATIVE
BUN BLD-MCNC: 14 MG/DL (ref 7–23)
BUN/CREAT SERPL: 18.4 (ref 7–25)
CALCIUM SPEC-SCNC: 9.3 MG/DL (ref 8.4–10.2)
CHLORIDE SERPL-SCNC: 103 MMOL/L (ref 101–112)
CHOLEST SERPL-MCNC: 251 MG/DL (ref 150–200)
CLARITY UR: CLEAR
CO2 SERPL-SCNC: 26 MMOL/L (ref 22–30)
COLOR UR: YELLOW
CREAT BLD-MCNC: 0.76 MG/DL (ref 0.7–1.3)
DEPRECATED RDW RBC AUTO: 44 FL (ref 37–54)
EOSINOPHIL # BLD MANUAL: 0.18 10*3/MM3 (ref 0–0.4)
EOSINOPHIL NFR BLD MANUAL: 2 % (ref 0.3–6.2)
ERYTHROCYTE [DISTWIDTH] IN BLOOD BY AUTOMATED COUNT: 14.4 % (ref 12.3–15.4)
ERYTHROCYTE [SEDIMENTATION RATE] IN BLOOD: 5 MM/HR (ref 0–15)
GFR SERPL CREATININE-BSD FRML MDRD: 124 ML/MIN/1.73 (ref 77–179)
GLOBULIN UR ELPH-MCNC: 3.9 GM/DL (ref 2.3–3.5)
GLUCOSE BLD-MCNC: 148 MG/DL (ref 70–99)
GLUCOSE UR STRIP-MCNC: NEGATIVE MG/DL
HCT VFR BLD AUTO: 47.7 % (ref 37.5–51)
HDLC SERPL-MCNC: 41 MG/DL (ref 40–59)
HGB BLD-MCNC: 16.5 G/DL (ref 13–17.7)
HGB UR QL STRIP.AUTO: NEGATIVE
KETONES UR QL STRIP: NEGATIVE
LARGE PLATELETS: ABNORMAL
LDLC SERPL CALC-MCNC: 160 MG/DL
LDLC/HDLC SERPL: 3.91 {RATIO} (ref 0–3.55)
LEUKOCYTE ESTERASE UR QL STRIP.AUTO: NEGATIVE
LYMPHOCYTES # BLD MANUAL: 3.59 10*3/MM3 (ref 0.7–3.1)
LYMPHOCYTES NFR BLD MANUAL: 41 % (ref 19.6–45.3)
LYMPHOCYTES NFR BLD MANUAL: 9 % (ref 5–12)
MCH RBC QN AUTO: 29.2 PG (ref 26.6–33)
MCHC RBC AUTO-ENTMCNC: 34.6 G/DL (ref 31.5–35.7)
MCV RBC AUTO: 84.3 FL (ref 79–97)
MICROCYTES BLD QL: ABNORMAL
MONOCYTES # BLD AUTO: 0.79 10*3/MM3 (ref 0.1–0.9)
NEUTROPHILS # BLD AUTO: 4.11 10*3/MM3 (ref 1.7–7)
NEUTROPHILS NFR BLD MANUAL: 47 % (ref 42.7–76)
NITRITE UR QL STRIP: NEGATIVE
PH UR STRIP.AUTO: 6.5 [PH] (ref 5.5–8)
PLATELET # BLD AUTO: 214 10*3/MM3 (ref 140–450)
PMV BLD AUTO: 9.5 FL (ref 6–12)
POTASSIUM BLD-SCNC: 4.3 MMOL/L (ref 3.4–5)
PROT SERPL-MCNC: 8.3 G/DL (ref 6.3–8.6)
PROT UR QL STRIP: NEGATIVE
RBC # BLD AUTO: 5.66 10*6/MM3 (ref 4.14–5.8)
SMALL PLATELETS BLD QL SMEAR: ADEQUATE
SODIUM BLD-SCNC: 136 MMOL/L (ref 137–145)
SP GR UR STRIP: 1.01 (ref 1–1.03)
TRIGL SERPL-MCNC: 249 MG/DL
UROBILINOGEN UR QL STRIP: NORMAL
VLDLC SERPL-MCNC: 49.8 MG/DL
WBC MORPH BLD: NORMAL
WBC NRBC COR # BLD: 8.75 10*3/MM3 (ref 3.4–10.8)

## 2020-05-19 PROCEDURE — 80053 COMPREHEN METABOLIC PANEL: CPT | Performed by: NURSE PRACTITIONER

## 2020-05-19 PROCEDURE — 80061 LIPID PANEL: CPT | Performed by: NURSE PRACTITIONER

## 2020-05-19 PROCEDURE — 85651 RBC SED RATE NONAUTOMATED: CPT | Performed by: NURSE PRACTITIONER

## 2020-05-19 PROCEDURE — 81003 URINALYSIS AUTO W/O SCOPE: CPT | Performed by: NURSE PRACTITIONER

## 2020-05-19 PROCEDURE — 85025 COMPLETE CBC W/AUTO DIFF WBC: CPT | Performed by: NURSE PRACTITIONER

## 2020-05-19 PROCEDURE — 82306 VITAMIN D 25 HYDROXY: CPT | Performed by: NURSE PRACTITIONER

## 2020-05-19 NOTE — TELEPHONE ENCOUNTER
Okay yesterday we discussed doing labs and xrays if not helping at all, can he come in to do those please, he just goes to the front and says he is here for xrays, they are already ordered.       dont mention shot.  No shot, needs labs first so I can check kidney function since he hasn't had them inawhile.

## 2020-05-19 NOTE — TELEPHONE ENCOUNTER
Patient called and is not seeming to notice any difference yet. Still has pain in right side of back, not the spine.

## 2020-05-20 LAB — 25(OH)D3 SERPL-MCNC: 18.5 NG/ML (ref 30–100)

## 2020-05-21 DIAGNOSIS — R73.9 HYPERGLYCEMIA: Primary | ICD-10-CM

## 2020-05-21 PROCEDURE — 83036 HEMOGLOBIN GLYCOSYLATED A1C: CPT | Performed by: NURSE PRACTITIONER

## 2020-05-21 PROCEDURE — 36415 COLL VENOUS BLD VENIPUNCTURE: CPT | Performed by: NURSE PRACTITIONER

## 2020-05-22 DIAGNOSIS — E55.9 VITAMIN D DEFICIENCY: Primary | ICD-10-CM

## 2020-05-22 DIAGNOSIS — R74.8 ELEVATED LIVER ENZYMES: ICD-10-CM

## 2020-05-22 DIAGNOSIS — E78.2 MIXED HYPERLIPIDEMIA: ICD-10-CM

## 2020-05-22 DIAGNOSIS — E11.65 TYPE 2 DIABETES MELLITUS WITH HYPERGLYCEMIA, WITHOUT LONG-TERM CURRENT USE OF INSULIN (HCC): ICD-10-CM

## 2020-05-22 LAB — HBA1C MFR BLD: 7.2 % (ref 4.8–5.6)

## 2020-05-22 RX ORDER — ATORVASTATIN CALCIUM 20 MG/1
20 TABLET, FILM COATED ORAL NIGHTLY
Qty: 30 TABLET | Refills: 5 | Status: SHIPPED | OUTPATIENT
Start: 2020-05-22 | End: 2020-09-08 | Stop reason: SDUPTHER

## 2020-05-22 RX ORDER — ERGOCALCIFEROL 1.25 MG/1
50000 CAPSULE ORAL WEEKLY
Qty: 5 CAPSULE | Refills: 5 | Status: SHIPPED | OUTPATIENT
Start: 2020-05-22 | End: 2020-09-08 | Stop reason: SDUPTHER

## 2020-06-18 ENCOUNTER — LAB (OUTPATIENT)
Dept: LAB | Facility: OTHER | Age: 25
End: 2020-06-18

## 2020-06-18 DIAGNOSIS — R74.8 ELEVATED LIVER ENZYMES: ICD-10-CM

## 2020-06-18 LAB
ALBUMIN SERPL-MCNC: 4.4 G/DL (ref 3.5–5)
ALBUMIN/GLOB SERPL: 1.2 G/DL (ref 1.1–1.8)
ALP SERPL-CCNC: 164 U/L (ref 38–126)
ALT SERPL W P-5'-P-CCNC: 81 U/L
ANION GAP SERPL CALCULATED.3IONS-SCNC: 8 MMOL/L (ref 5–15)
AST SERPL-CCNC: 49 U/L (ref 17–59)
BILIRUB SERPL-MCNC: 0.9 MG/DL (ref 0.2–1.3)
BUN BLD-MCNC: 15 MG/DL (ref 7–23)
BUN/CREAT SERPL: 17 (ref 7–25)
CALCIUM SPEC-SCNC: 9.2 MG/DL (ref 8.4–10.2)
CHLORIDE SERPL-SCNC: 102 MMOL/L (ref 101–112)
CO2 SERPL-SCNC: 28 MMOL/L (ref 22–30)
CREAT BLD-MCNC: 0.88 MG/DL (ref 0.7–1.3)
GFR SERPL CREATININE-BSD FRML MDRD: 105 ML/MIN/1.73 (ref 77–179)
GLOBULIN UR ELPH-MCNC: 3.6 GM/DL (ref 2.3–3.5)
GLUCOSE BLD-MCNC: 165 MG/DL (ref 70–99)
POTASSIUM BLD-SCNC: 4.4 MMOL/L (ref 3.4–5)
PROT SERPL-MCNC: 8 G/DL (ref 6.3–8.6)
SODIUM BLD-SCNC: 138 MMOL/L (ref 137–145)

## 2020-06-18 PROCEDURE — 80053 COMPREHEN METABOLIC PANEL: CPT | Performed by: NURSE PRACTITIONER

## 2020-06-19 DIAGNOSIS — E78.2 MIXED HYPERLIPIDEMIA: ICD-10-CM

## 2020-06-19 DIAGNOSIS — E55.9 VITAMIN D DEFICIENCY: Primary | ICD-10-CM

## 2020-06-19 DIAGNOSIS — R74.8 ELEVATED LIVER ENZYMES: ICD-10-CM

## 2020-06-19 DIAGNOSIS — E11.65 TYPE 2 DIABETES MELLITUS WITH HYPERGLYCEMIA, WITHOUT LONG-TERM CURRENT USE OF INSULIN (HCC): ICD-10-CM

## 2020-08-14 ENCOUNTER — TELEPHONE (OUTPATIENT)
Dept: FAMILY MEDICINE CLINIC | Facility: CLINIC | Age: 25
End: 2020-08-14

## 2020-08-14 DIAGNOSIS — E11.65 TYPE 2 DIABETES MELLITUS WITH HYPERGLYCEMIA, WITHOUT LONG-TERM CURRENT USE OF INSULIN (HCC): ICD-10-CM

## 2020-09-01 ENCOUNTER — LAB (OUTPATIENT)
Dept: LAB | Facility: OTHER | Age: 25
End: 2020-09-01

## 2020-09-01 DIAGNOSIS — E55.9 VITAMIN D DEFICIENCY: ICD-10-CM

## 2020-09-01 DIAGNOSIS — E78.2 MIXED HYPERLIPIDEMIA: ICD-10-CM

## 2020-09-01 DIAGNOSIS — R74.8 ELEVATED LIVER ENZYMES: ICD-10-CM

## 2020-09-01 DIAGNOSIS — E11.65 TYPE 2 DIABETES MELLITUS WITH HYPERGLYCEMIA, WITHOUT LONG-TERM CURRENT USE OF INSULIN (HCC): ICD-10-CM

## 2020-09-01 LAB
ALBUMIN SERPL-MCNC: 4.3 G/DL (ref 3.5–5)
ALBUMIN/GLOB SERPL: 1.1 G/DL (ref 1.1–1.8)
ALP SERPL-CCNC: 177 U/L (ref 38–126)
ALT SERPL W P-5'-P-CCNC: 49 U/L
ANION GAP SERPL CALCULATED.3IONS-SCNC: 9 MMOL/L (ref 5–15)
AST SERPL-CCNC: 29 U/L (ref 17–59)
BASOPHILS # BLD MANUAL: 0.1 10*3/MM3 (ref 0–0.2)
BASOPHILS NFR BLD AUTO: 1 % (ref 0–1.5)
BILIRUB SERPL-MCNC: 0.8 MG/DL (ref 0.2–1.3)
BUN SERPL-MCNC: 14 MG/DL (ref 7–23)
BUN/CREAT SERPL: 16.9 (ref 7–25)
CALCIUM SPEC-SCNC: 9.3 MG/DL (ref 8.4–10.2)
CHLORIDE SERPL-SCNC: 100 MMOL/L (ref 101–112)
CHOLEST SERPL-MCNC: 153 MG/DL (ref 150–200)
CO2 SERPL-SCNC: 28 MMOL/L (ref 22–30)
CREAT SERPL-MCNC: 0.83 MG/DL (ref 0.7–1.3)
DEPRECATED RDW RBC AUTO: 43.8 FL (ref 37–54)
EOSINOPHIL # BLD MANUAL: 0.1 10*3/MM3 (ref 0–0.4)
EOSINOPHIL NFR BLD MANUAL: 1 % (ref 0.3–6.2)
ERYTHROCYTE [DISTWIDTH] IN BLOOD BY AUTOMATED COUNT: 14.4 % (ref 12.3–15.4)
GFR SERPL CREATININE-BSD FRML MDRD: 112 ML/MIN/1.73 (ref 77–179)
GLOBULIN UR ELPH-MCNC: 3.8 GM/DL (ref 2.3–3.5)
GLUCOSE SERPL-MCNC: 180 MG/DL (ref 70–99)
HCT VFR BLD AUTO: 47.2 % (ref 37.5–51)
HDLC SERPL-MCNC: 34 MG/DL (ref 40–59)
HGB BLD-MCNC: 15.6 G/DL (ref 13–17.7)
LDLC SERPL CALC-MCNC: 88 MG/DL
LDLC/HDLC SERPL: 2.59 {RATIO} (ref 0–3.55)
LYMPHOCYTES # BLD MANUAL: 2.96 10*3/MM3 (ref 0.7–3.1)
LYMPHOCYTES NFR BLD MANUAL: 10 % (ref 5–12)
LYMPHOCYTES NFR BLD MANUAL: 30 % (ref 19.6–45.3)
MCH RBC QN AUTO: 28.1 PG (ref 26.6–33)
MCHC RBC AUTO-ENTMCNC: 33.1 G/DL (ref 31.5–35.7)
MCV RBC AUTO: 84.9 FL (ref 79–97)
MONOCYTES # BLD AUTO: 0.99 10*3/MM3 (ref 0.1–0.9)
NEUTROPHILS # BLD AUTO: 5.63 10*3/MM3 (ref 1.7–7)
NEUTROPHILS NFR BLD MANUAL: 57 % (ref 42.7–76)
PLATELET # BLD AUTO: 231 10*3/MM3 (ref 140–450)
PMV BLD AUTO: 8.8 FL (ref 6–12)
POTASSIUM SERPL-SCNC: 4.7 MMOL/L (ref 3.4–5)
PROT SERPL-MCNC: 8.1 G/DL (ref 6.3–8.6)
RBC # BLD AUTO: 5.56 10*6/MM3 (ref 4.14–5.8)
RBC MORPH BLD: NORMAL
SMALL PLATELETS BLD QL SMEAR: ADEQUATE
SODIUM SERPL-SCNC: 137 MMOL/L (ref 137–145)
TRIGL SERPL-MCNC: 154 MG/DL
VARIANT LYMPHS NFR BLD MANUAL: 1 % (ref 0–5)
VLDLC SERPL-MCNC: 30.8 MG/DL
WBC # BLD AUTO: 9.87 10*3/MM3 (ref 3.4–10.8)
WBC MORPH BLD: NORMAL

## 2020-09-01 PROCEDURE — 80053 COMPREHEN METABOLIC PANEL: CPT | Performed by: NURSE PRACTITIONER

## 2020-09-01 PROCEDURE — 84439 ASSAY OF FREE THYROXINE: CPT | Performed by: NURSE PRACTITIONER

## 2020-09-01 PROCEDURE — 36415 COLL VENOUS BLD VENIPUNCTURE: CPT | Performed by: NURSE PRACTITIONER

## 2020-09-01 PROCEDURE — 84443 ASSAY THYROID STIM HORMONE: CPT | Performed by: NURSE PRACTITIONER

## 2020-09-01 PROCEDURE — 85025 COMPLETE CBC W/AUTO DIFF WBC: CPT | Performed by: NURSE PRACTITIONER

## 2020-09-01 PROCEDURE — 83036 HEMOGLOBIN GLYCOSYLATED A1C: CPT | Performed by: NURSE PRACTITIONER

## 2020-09-01 PROCEDURE — 80061 LIPID PANEL: CPT | Performed by: NURSE PRACTITIONER

## 2020-09-01 PROCEDURE — 82306 VITAMIN D 25 HYDROXY: CPT | Performed by: NURSE PRACTITIONER

## 2020-09-02 LAB
25(OH)D3 SERPL-MCNC: 37.4 NG/ML (ref 30–100)
HBA1C MFR BLD: 7.1 % (ref 4.8–5.6)
T4 FREE SERPL-MCNC: 1.14 NG/DL (ref 0.93–1.7)
TSH SERPL DL<=0.05 MIU/L-ACNC: 1.43 UIU/ML (ref 0.27–4.2)

## 2020-09-08 ENCOUNTER — OFFICE VISIT (OUTPATIENT)
Dept: FAMILY MEDICINE CLINIC | Facility: CLINIC | Age: 25
End: 2020-09-08

## 2020-09-08 VITALS
WEIGHT: 315 LBS | SYSTOLIC BLOOD PRESSURE: 122 MMHG | OXYGEN SATURATION: 97 % | HEART RATE: 97 BPM | HEIGHT: 71 IN | RESPIRATION RATE: 16 BRPM | TEMPERATURE: 98 F | BODY MASS INDEX: 44.1 KG/M2 | DIASTOLIC BLOOD PRESSURE: 74 MMHG

## 2020-09-08 DIAGNOSIS — M54.6 ACUTE RIGHT-SIDED THORACIC BACK PAIN: ICD-10-CM

## 2020-09-08 DIAGNOSIS — E78.2 MIXED HYPERLIPIDEMIA: ICD-10-CM

## 2020-09-08 DIAGNOSIS — F31.5 BIPOLAR AFFECTIVE DISORDER, DEPRESSED, SEVERE, WITH PSYCHOTIC BEHAVIOR (HCC): ICD-10-CM

## 2020-09-08 DIAGNOSIS — F10.10 ALCOHOL USE DISORDER, MILD, ABUSE: ICD-10-CM

## 2020-09-08 DIAGNOSIS — F40.10 SOCIAL ANXIETY DISORDER: ICD-10-CM

## 2020-09-08 DIAGNOSIS — E11.65 TYPE 2 DIABETES MELLITUS WITH HYPERGLYCEMIA, WITHOUT LONG-TERM CURRENT USE OF INSULIN (HCC): Primary | ICD-10-CM

## 2020-09-08 DIAGNOSIS — R74.8 ELEVATED LIVER ENZYMES: ICD-10-CM

## 2020-09-08 DIAGNOSIS — I10 HYPERTENSION, UNSPECIFIED TYPE: ICD-10-CM

## 2020-09-08 DIAGNOSIS — F90.0 ADHD (ATTENTION DEFICIT HYPERACTIVITY DISORDER), INATTENTIVE TYPE: ICD-10-CM

## 2020-09-08 DIAGNOSIS — R10.9 RIGHT FLANK PAIN: ICD-10-CM

## 2020-09-08 DIAGNOSIS — E55.9 VITAMIN D DEFICIENCY: ICD-10-CM

## 2020-09-08 DIAGNOSIS — R45.851 SUICIDAL IDEATIONS: ICD-10-CM

## 2020-09-08 PROCEDURE — 99214 OFFICE O/P EST MOD 30 MIN: CPT | Performed by: NURSE PRACTITIONER

## 2020-09-08 RX ORDER — LANCETS 28 GAUGE
1 EACH MISCELLANEOUS 3 TIMES DAILY
Qty: 200 EACH | Refills: 12 | Status: SHIPPED | OUTPATIENT
Start: 2020-09-08 | End: 2020-10-13 | Stop reason: SDUPTHER

## 2020-09-08 RX ORDER — BLOOD-GLUCOSE METER
1 KIT MISCELLANEOUS DAILY
Qty: 1 EACH | Refills: 0 | Status: SHIPPED | OUTPATIENT
Start: 2020-09-08

## 2020-09-08 RX ORDER — LUMATEPERONE 42 MG/1
CAPSULE ORAL
COMMUNITY
Start: 2020-09-03

## 2020-09-08 RX ORDER — SUVOREXANT 20 MG/1
TABLET, FILM COATED ORAL
COMMUNITY
Start: 2020-09-03

## 2020-09-08 RX ORDER — ERGOCALCIFEROL 1.25 MG/1
50000 CAPSULE ORAL WEEKLY
Qty: 5 CAPSULE | Refills: 5 | Status: SHIPPED | OUTPATIENT
Start: 2020-09-08

## 2020-09-08 RX ORDER — ATORVASTATIN CALCIUM 20 MG/1
20 TABLET, FILM COATED ORAL NIGHTLY
Qty: 30 TABLET | Refills: 5 | Status: SHIPPED | OUTPATIENT
Start: 2020-09-08 | End: 2021-03-18

## 2020-09-08 NOTE — PROGRESS NOTES
"Subjective   Rory Alvarado is a 25 y.o. male who presents for OV for f/u on labs.     History of Present Illness     Last labs done on 9/1/20:  Thyroid labs wnl  Vit d improved from 18 to 37.4 will continue to monitor q 3mtns.   Cbc good except monocytes slightly elevated.   Cholesterol good ldl decreased from 160 to 88. Trig decreased from 249 to 154.   a1c improved slightly from 7.2 to 7.1   cmp wnl except globulin elevated at 3.8     ADHD/social anxiety disorder/bipolar disorder/hx of suicidal ideations-chronic, controlled on belsomra at hs, zoloft 50mg daily, acplyta 42mg daily, and klonopin 0.5mg tid.   -9/27/20: belsomra helps with sleep, very inactive during the day. Nap during the daytime depends once to multiple times.   -poc: labs to assess fatigue ordered. Psych handles meds, fatigue could be secondary to meds, told pt to discuss with kailey. Pt states he does snore but refuses referral or sleep apnea test for sleep apnea machine.     T2DM-chronic, improving only slightly on metformin 500mg bid  -POC: going to work on diet as well.     Vit d deficiency-chronic, controlled on vit d [id number] weekly.    Hyperlipidemia-chronic, controlled on atorvastatin 20mg at hs.     HTN-chronic, controlled without medication currently.     Hx of elevated liver enzymes. Last checked 9/1/20 and wnl.     Right sided pain-acute, improving with lodine and baclofen prn.   -5/18/20: one week or so ago this started, worse the past few days. [place] up back behind rib cage. When he breaths in and out, hurts like a sore spot. No radiation. Just hurts. Worse if pt gets in a \"bind.\" if pt moves towards the right and it hurts more. Any position that makes it better? As long as not better over. New pain never had before. Last few days been helping dad put floor in and that made it worse. Nonsmoker. No heavy lifting. Urine darker than normal. On his back but not towards the spine, it is right sided and midway up. Ibuprofen and " acetaminophen does not help the pain. POC: trial of baclofen and etodolac prn. Going to call tomorrow for work up if not improving.   -9/8/20: pt states doing better.   -POC: continue meds.     Possible stds/genital problem-acute, recurrent, new to me problem, not resolving  -Wants referral for possible stds, wants to see a male. States spots for years intermittently.   -POC: educated on abstinece preferred or notify partner and protection used til see specialist for more information, referral placed    F/u in four mtns with labs prior. Around 1/2/21.     The following portions of the patient's history were reviewed and updated as appropriate: allergies, current medications, past family history, past medical history, past social history, past surgical history and problem list.    Review of Systems   Constitutional: Negative for activity change, appetite change, chills, diaphoresis, fatigue, fever and unexpected weight change.   HENT: Negative for congestion, ear discharge, ear pain, hearing loss, postnasal drip, rhinorrhea, sinus pressure, sinus pain, sneezing, sore throat, tinnitus and trouble swallowing.    Eyes: Negative.    Respiratory: Negative for cough, chest tightness, shortness of breath and wheezing.    Cardiovascular: Negative for chest pain, palpitations and leg swelling.   Gastrointestinal: Negative for abdominal distention, abdominal pain, blood in stool, constipation, diarrhea, nausea and vomiting.   Genitourinary: Negative for decreased urine volume, discharge, dysuria, flank pain, frequency, hematuria, penile pain, penile swelling, scrotal swelling, testicular pain and urgency.        Darker urine    Musculoskeletal: Positive for back pain. Negative for arthralgias, gait problem, myalgias, neck pain and neck stiffness.   Neurological: Negative for dizziness, tremors, seizures, syncope, weakness, light-headedness, numbness and headaches.   Hematological: Does not bruise/bleed easily.  "  Psychiatric/Behavioral: Negative for agitation, behavioral problems, decreased concentration, self-injury, sleep disturbance and suicidal ideas. The patient is not nervous/anxious.          Objective   /74 (BP Location: Left arm, Patient Position: Sitting, Cuff Size: Adult)   Pulse 97   Temp 98 °F (36.7 °C)   Resp 16   Ht 180.3 cm (71\")   Wt (!) 163 kg (359 lb 3.2 oz)   SpO2 97%   BMI 50.10 kg/m²   Physical Exam   Constitutional: He is oriented to person, place, and time. He appears well-developed and well-nourished. He is cooperative. No distress.   Cardiovascular: Normal rate, regular rhythm, normal heart sounds and intact distal pulses. Exam reveals no gallop and no friction rub.   No murmur heard.  Pulmonary/Chest: Effort normal and breath sounds normal. No respiratory distress. He has no wheezes. He has no rales.   Abdominal: Soft. Normal appearance and bowel sounds are normal. He exhibits no shifting dullness, no distension, no pulsatile liver, no fluid wave, no abdominal bruit, no ascites, no pulsatile midline mass and no mass. There is no hepatosplenomegaly. There is no tenderness. There is no rigidity, no rebound, no guarding and no CVA tenderness. No hernia.   Neurological: He is alert and oriented to person, place, and time. He is not disoriented. Coordination and gait normal.   Skin: Skin is warm and dry.   Psychiatric: He has a normal mood and affect. His speech is normal and behavior is normal. Thought content normal. He is not actively hallucinating. Cognition and memory are normal.   Patient is dressed appropriately for weather and situation, makes eye contact, and engages in conversation.  He is attentive.   Nursing note and vitals reviewed.       PHQ-2/PHQ-9 Depression Screening 9/8/2020   Little interest or pleasure in doing things 1   Feeling down, depressed, or hopeless 1   Trouble falling or staying asleep, or sleeping too much 1   Feeling tired or having little energy 1   Poor " appetite or overeating 0   Feeling bad about yourself - or that you are a failure or have let yourself or your family down 0   Trouble concentrating on things, such as reading the newspaper or watching television 1   Moving or speaking so slowly that other people could have noticed. Or the opposite - being so fidgety or restless that you have been moving around a lot more than usual 0   Thoughts that you would be better off dead, or of hurting yourself in some way 0   Total Score 5   If you checked off any problems, how difficult have these problems made it for you to do your work, take care of things at home, or get along with other people? Somewhat difficult         Assessment/Plan   Rory was seen today for hypertension.    Diagnoses and all orders for this visit:    Type 2 diabetes mellitus with hyperglycemia, without long-term current use of insulin (CMS/[facility])  -     Comprehensive Metabolic Panel; Future  -     Lipid Panel; Future  -     Hemoglobin A1c; Future  -     Microalbumin / Creatinine Urine Ratio - Urine, Clean Catch; Future  -     Insulin, Free & Total, Serum; Future  -     glucose monitor monitoring kit; 1 each Daily.  -     glucose blood test strip; 1 each by Other route 3 (Three) Times a Day.  -     Lancets (FREESTYLE) lancets; 1 each by Other route 3 (Three) Times a Day.  -     metFORMIN (GLUCOPHAGE) 500 MG tablet; Take 1 tablet by mouth 2 (Two) Times a Day With Meals. For diabetes    Vitamin D deficiency  -     Vitamin D 25 Hydroxy; Future  -     vitamin D (ERGOCALCIFEROL) 1.25 MG (22073 UT) capsule capsule; Take 1 capsule by mouth 1 (One) Time Per Week. For vit d deficiency    Mixed hyperlipidemia  -     Lipid Panel; Future  -     atorvastatin (LIPITOR) 20 MG tablet; Take 1 tablet by mouth Every Night. For high cholesterol    Elevated liver enzymes  -     Comprehensive Metabolic Panel; Future    Acute right-sided thoracic back pain    Right flank pain    ADHD (attention deficit hyperactivity  disorder), inattentive type    Social anxiety disorder    Alcohol use disorder, mild, abuse    Bipolar affective disorder, depressed, severe, with psychotic behavior (CMS/HCC)    Suicidal ideations    Hypertension, unspecified type           Plan of Care:    Please See History of Present Illness(HPI) above for Plan of Care (POC) for individualized diagnoses as well as when to follow up.     Patient educated to follow-up sooner than next scheduled appointment if condition(s) worse or do not improve. Patient states understanding and is in agreeance with plan of care. An After Visit Summary was printed and given to the patient.      SELIN Ye        This document has been electronically signed by SELIN Ye on September 27, 2020 23:53 CDT      EMR/Transcription Dragon Disclaimer:  Some of this note may be an electronic dragon transcription/translation of spoken language to printed text. The electronic translation of spoken language may permit erroneous, or at times, nonsensical words or phrases to be inadvertently transcribed. Although I have reviewed the note for such errors, some may still exist.

## 2020-10-13 ENCOUNTER — TELEPHONE (OUTPATIENT)
Dept: FAMILY MEDICINE CLINIC | Facility: CLINIC | Age: 25
End: 2020-10-13

## 2020-10-13 DIAGNOSIS — E11.65 TYPE 2 DIABETES MELLITUS WITH HYPERGLYCEMIA, WITHOUT LONG-TERM CURRENT USE OF INSULIN (HCC): ICD-10-CM

## 2020-10-13 RX ORDER — BLOOD-GLUCOSE METER
1 KIT MISCELLANEOUS DAILY
Qty: 1 EACH | Refills: 0 | Status: SHIPPED | OUTPATIENT
Start: 2020-10-13

## 2020-10-13 RX ORDER — LANCETS 28 GAUGE
1 EACH MISCELLANEOUS 3 TIMES DAILY
Qty: 200 EACH | Refills: 12 | Status: SHIPPED | OUTPATIENT
Start: 2020-10-13

## 2021-01-06 ENCOUNTER — LAB (OUTPATIENT)
Dept: LAB | Facility: OTHER | Age: 26
End: 2021-01-06

## 2021-01-06 DIAGNOSIS — E78.2 MIXED HYPERLIPIDEMIA: ICD-10-CM

## 2021-01-06 DIAGNOSIS — R74.8 ELEVATED LIVER ENZYMES: ICD-10-CM

## 2021-01-06 DIAGNOSIS — E55.9 VITAMIN D DEFICIENCY: ICD-10-CM

## 2021-01-06 DIAGNOSIS — E11.65 TYPE 2 DIABETES MELLITUS WITH HYPERGLYCEMIA, WITHOUT LONG-TERM CURRENT USE OF INSULIN (HCC): ICD-10-CM

## 2021-01-06 LAB
ALBUMIN SERPL-MCNC: 4.3 G/DL (ref 3.5–5)
ALBUMIN/GLOB SERPL: 1.2 G/DL (ref 1.1–1.8)
ALP SERPL-CCNC: 132 U/L (ref 38–126)
ALT SERPL W P-5'-P-CCNC: 44 U/L
ANION GAP SERPL CALCULATED.3IONS-SCNC: 9 MMOL/L (ref 5–15)
AST SERPL-CCNC: 29 U/L (ref 17–59)
BILIRUB SERPL-MCNC: 0.7 MG/DL (ref 0.2–1.3)
BUN SERPL-MCNC: 14 MG/DL (ref 7–23)
BUN/CREAT SERPL: 17.9 (ref 7–25)
CALCIUM SPEC-SCNC: 9.1 MG/DL (ref 8.4–10.2)
CHLORIDE SERPL-SCNC: 100 MMOL/L (ref 101–112)
CHOLEST SERPL-MCNC: 244 MG/DL (ref 150–200)
CO2 SERPL-SCNC: 29 MMOL/L (ref 22–30)
CREAT SERPL-MCNC: 0.78 MG/DL (ref 0.7–1.3)
GFR SERPL CREATININE-BSD FRML MDRD: 120 ML/MIN/1.73 (ref 77–179)
GLOBULIN UR ELPH-MCNC: 3.5 GM/DL (ref 2.3–3.5)
GLUCOSE SERPL-MCNC: 163 MG/DL (ref 70–99)
HBA1C MFR BLD: 7.56 % (ref 4.8–5.6)
HDLC SERPL-MCNC: 33 MG/DL (ref 40–59)
LDLC SERPL CALC-MCNC: 176 MG/DL
LDLC/HDLC SERPL: 5.26 {RATIO} (ref 0–3.55)
POTASSIUM SERPL-SCNC: 4.5 MMOL/L (ref 3.4–5)
PROT SERPL-MCNC: 7.8 G/DL (ref 6.3–8.6)
SODIUM SERPL-SCNC: 138 MMOL/L (ref 137–145)
TRIGL SERPL-MCNC: 187 MG/DL
VLDLC SERPL-MCNC: 35 MG/DL (ref 5–40)

## 2021-01-06 PROCEDURE — 83036 HEMOGLOBIN GLYCOSYLATED A1C: CPT | Performed by: NURSE PRACTITIONER

## 2021-01-06 PROCEDURE — 83525 ASSAY OF INSULIN: CPT | Performed by: NURSE PRACTITIONER

## 2021-01-06 PROCEDURE — 83527 ASSAY OF INSULIN: CPT | Performed by: NURSE PRACTITIONER

## 2021-01-06 PROCEDURE — 82306 VITAMIN D 25 HYDROXY: CPT | Performed by: NURSE PRACTITIONER

## 2021-01-06 PROCEDURE — 82570 ASSAY OF URINE CREATININE: CPT | Performed by: NURSE PRACTITIONER

## 2021-01-06 PROCEDURE — 82043 UR ALBUMIN QUANTITATIVE: CPT | Performed by: NURSE PRACTITIONER

## 2021-01-06 PROCEDURE — 36415 COLL VENOUS BLD VENIPUNCTURE: CPT | Performed by: NURSE PRACTITIONER

## 2021-01-06 PROCEDURE — 80061 LIPID PANEL: CPT | Performed by: NURSE PRACTITIONER

## 2021-01-06 PROCEDURE — 80053 COMPREHEN METABOLIC PANEL: CPT | Performed by: NURSE PRACTITIONER

## 2021-01-07 LAB
25(OH)D3 SERPL-MCNC: 29.1 NG/ML (ref 30–100)
ALBUMIN UR-MCNC: <1.2 MG/DL
CREAT UR-MCNC: 84.4 MG/DL
MICROALBUMIN/CREAT UR: NORMAL MG/G{CREAT}

## 2021-01-11 ENCOUNTER — OFFICE VISIT (OUTPATIENT)
Dept: FAMILY MEDICINE CLINIC | Facility: CLINIC | Age: 26
End: 2021-01-11

## 2021-01-11 VITALS
OXYGEN SATURATION: 98 % | RESPIRATION RATE: 16 BRPM | SYSTOLIC BLOOD PRESSURE: 130 MMHG | BODY MASS INDEX: 44.1 KG/M2 | HEART RATE: 103 BPM | HEIGHT: 71 IN | DIASTOLIC BLOOD PRESSURE: 80 MMHG | WEIGHT: 315 LBS

## 2021-01-11 DIAGNOSIS — A64 STD (MALE): ICD-10-CM

## 2021-01-11 DIAGNOSIS — R21 RASH OF GENITAL AREA: ICD-10-CM

## 2021-01-11 DIAGNOSIS — E11.65 TYPE 2 DIABETES MELLITUS WITH HYPERGLYCEMIA, WITHOUT LONG-TERM CURRENT USE OF INSULIN (HCC): Primary | ICD-10-CM

## 2021-01-11 DIAGNOSIS — Z20.2 POSSIBLE EXPOSURE TO STD: Primary | ICD-10-CM

## 2021-01-11 PROCEDURE — 99214 OFFICE O/P EST MOD 30 MIN: CPT | Performed by: NURSE PRACTITIONER

## 2021-01-13 LAB
INSULIN FREE SERPL-ACNC: 26 UU/ML
INSULIN SERPL-ACNC: 26 UU/ML

## 2021-01-20 DIAGNOSIS — E55.9 VITAMIN D DEFICIENCY: ICD-10-CM

## 2021-01-20 DIAGNOSIS — E78.2 MIXED HYPERLIPIDEMIA: ICD-10-CM

## 2021-01-20 DIAGNOSIS — E11.65 TYPE 2 DIABETES MELLITUS WITH HYPERGLYCEMIA, WITHOUT LONG-TERM CURRENT USE OF INSULIN (HCC): Primary | ICD-10-CM

## 2021-01-20 DIAGNOSIS — R74.8 ELEVATED LIVER ENZYMES: ICD-10-CM

## 2021-01-21 DIAGNOSIS — N48.9 PENIS SYMPTOM OR SIGN: ICD-10-CM

## 2021-01-21 DIAGNOSIS — R74.8 ELEVATED LIVER ENZYMES: ICD-10-CM

## 2021-01-21 DIAGNOSIS — E11.65 TYPE 2 DIABETES MELLITUS WITH HYPERGLYCEMIA, WITHOUT LONG-TERM CURRENT USE OF INSULIN (HCC): ICD-10-CM

## 2021-01-21 DIAGNOSIS — E78.2 MIXED HYPERLIPIDEMIA: ICD-10-CM

## 2021-01-21 DIAGNOSIS — E55.9 VITAMIN D DEFICIENCY: Primary | ICD-10-CM

## 2021-01-21 NOTE — PROGRESS NOTES
Patient does have elevated insulin levels.  As we talked about at his appointment if these were elevated because he has diabetes I would like to increase his Metformin dose and recheck these in 3 months.  So I am going to increase his Metformin to where he is taking 2 tablets twice daily instead of 1 tablet twice daily.  Please continue take with meals if any nausea vomiting and diarrhea associated with it let me know and I will switch him over to extended release.  Would like to recheck these insulin levels in the middle of April.  Fasting.     Please let him know that we faxed over referral and note to urology they said that they would not schedule an appointment over the phone that they would call the patient can you please make sure that they have called the patient.  We also did give them very detailed description of when he needs to be seen as soon as possible and what times of the day etc.

## 2021-03-17 DIAGNOSIS — E78.2 MIXED HYPERLIPIDEMIA: ICD-10-CM

## 2021-03-18 RX ORDER — ATORVASTATIN CALCIUM 20 MG/1
20 TABLET, FILM COATED ORAL NIGHTLY
Qty: 30 TABLET | Refills: 5 | Status: SHIPPED | OUTPATIENT
Start: 2021-03-18